# Patient Record
Sex: FEMALE | Race: WHITE | NOT HISPANIC OR LATINO | Employment: OTHER | URBAN - METROPOLITAN AREA
[De-identification: names, ages, dates, MRNs, and addresses within clinical notes are randomized per-mention and may not be internally consistent; named-entity substitution may affect disease eponyms.]

---

## 2017-01-09 ENCOUNTER — TELEPHONE (OUTPATIENT)
Dept: ENDOCRINOLOGY | Facility: CLINIC | Age: 62
End: 2017-01-09

## 2017-01-10 NOTE — TELEPHONE ENCOUNTER
I called Ms.Mary Kay Schwartz to discuss her FNA results   Her FNA shows FLUS   Options are to wait for 6 weeks and repeat FNA with/without genetic testing

## 2017-01-17 ENCOUNTER — PATIENT MESSAGE (OUTPATIENT)
Dept: ENDOCRINOLOGY | Facility: CLINIC | Age: 62
End: 2017-01-17

## 2017-01-23 ENCOUNTER — PATIENT MESSAGE (OUTPATIENT)
Dept: NEUROSURGERY | Facility: CLINIC | Age: 62
End: 2017-01-23

## 2017-01-23 NOTE — TELEPHONE ENCOUNTER
I called Ms.Mary Kay Schwartz discussed about her FLUS   Discussed different options observation vs repeat FNA with or without genetic testing and getting surgery   She would like to think about options and will let us know

## 2017-02-02 ENCOUNTER — PATIENT MESSAGE (OUTPATIENT)
Dept: NEUROSURGERY | Facility: CLINIC | Age: 62
End: 2017-02-02

## 2017-02-06 ENCOUNTER — PATIENT MESSAGE (OUTPATIENT)
Dept: ENDOCRINOLOGY | Facility: CLINIC | Age: 62
End: 2017-02-06

## 2017-03-09 DIAGNOSIS — D35.2 PROLACTINOMA: ICD-10-CM

## 2017-03-09 DIAGNOSIS — E22.0 ACROMEGALY: ICD-10-CM

## 2017-03-09 RX ORDER — CABERGOLINE 0.5 MG/1
TABLET ORAL
Qty: 16 TABLET | Refills: 6 | Status: SHIPPED | OUTPATIENT
Start: 2017-03-09 | End: 2018-03-27 | Stop reason: SDUPTHER

## 2017-04-10 RX ORDER — LEVOTHYROXINE SODIUM 75 UG/1
TABLET ORAL
Qty: 90 TABLET | Refills: 4 | Status: SHIPPED | OUTPATIENT
Start: 2017-04-10 | End: 2017-11-28 | Stop reason: SDUPTHER

## 2017-06-05 ENCOUNTER — PATIENT MESSAGE (OUTPATIENT)
Dept: ENDOCRINOLOGY | Facility: CLINIC | Age: 62
End: 2017-06-05

## 2017-06-05 DIAGNOSIS — E22.0 ACROMEGALY: Primary | ICD-10-CM

## 2017-06-07 NOTE — TELEPHONE ENCOUNTER
Schedule blood work 1 week prior to visit - external orders - please fax her   Book visit with me in pituitary clinic  During visit we will also discuss out goiter    Sent [employer] message to patient explaining the plan.

## 2017-06-26 RX ORDER — HYDROCORTISONE 10 MG/1
TABLET ORAL
Qty: 270 TABLET | Refills: 11 | Status: SHIPPED | OUTPATIENT
Start: 2017-06-26 | End: 2018-07-02 | Stop reason: SDUPTHER

## 2017-07-10 ENCOUNTER — LAB VISIT (OUTPATIENT)
Dept: LAB | Facility: HOSPITAL | Age: 62
End: 2017-07-10
Attending: INTERNAL MEDICINE
Payer: COMMERCIAL

## 2017-07-10 DIAGNOSIS — E03.9 HYPOTHYROIDISM, UNSPECIFIED TYPE: ICD-10-CM

## 2017-07-10 DIAGNOSIS — E22.0 ACROMEGALY: ICD-10-CM

## 2017-07-10 DIAGNOSIS — E03.9 HYPOTHYROIDISM, UNSPECIFIED TYPE: Primary | ICD-10-CM

## 2017-07-10 DIAGNOSIS — E23.0 PANHYPOPITUITARISM: ICD-10-CM

## 2017-07-10 LAB
ALBUMIN SERPL BCP-MCNC: 3.9 G/DL
ALP SERPL-CCNC: 59 U/L
ALT SERPL W/O P-5'-P-CCNC: 18 U/L
ANION GAP SERPL CALC-SCNC: 9 MMOL/L
AST SERPL-CCNC: 18 U/L
BILIRUB SERPL-MCNC: 0.4 MG/DL
BUN SERPL-MCNC: 11 MG/DL
CALCIUM SERPL-MCNC: 9.3 MG/DL
CHLORIDE SERPL-SCNC: 109 MMOL/L
CO2 SERPL-SCNC: 23 MMOL/L
CREAT SERPL-MCNC: 0.9 MG/DL
EST. GFR  (AFRICAN AMERICAN): >60 ML/MIN/1.73 M^2
EST. GFR  (NON AFRICAN AMERICAN): >60 ML/MIN/1.73 M^2
ESTIMATED AVG GLUCOSE: 108 MG/DL
GLUCOSE SERPL-MCNC: 80 MG/DL
HBA1C MFR BLD HPLC: 5.4 %
POTASSIUM SERPL-SCNC: 4.2 MMOL/L
PROT SERPL-MCNC: 7.1 G/DL
SODIUM SERPL-SCNC: 141 MMOL/L
T4 FREE SERPL-MCNC: 1.07 NG/DL
TSH SERPL DL<=0.005 MIU/L-ACNC: 0.03 UIU/ML

## 2017-07-10 PROCEDURE — 83036 HEMOGLOBIN GLYCOSYLATED A1C: CPT

## 2017-07-10 PROCEDURE — 80053 COMPREHEN METABOLIC PANEL: CPT

## 2017-07-10 PROCEDURE — 83003 ASSAY GROWTH HORMONE (HGH): CPT

## 2017-07-10 PROCEDURE — 84439 ASSAY OF FREE THYROXINE: CPT

## 2017-07-10 PROCEDURE — 36415 COLL VENOUS BLD VENIPUNCTURE: CPT | Mod: PO

## 2017-07-10 PROCEDURE — 84305 ASSAY OF SOMATOMEDIN: CPT

## 2017-07-10 PROCEDURE — 84443 ASSAY THYROID STIM HORMONE: CPT

## 2017-07-11 LAB — GH SERPL-MCNC: 0.2 NG/ML

## 2017-07-13 LAB
IGF-I SERPL-MCNC: 158 NG/ML (ref 35–201)
IGF-I Z-SCORE SERPL: 1.28 SD

## 2017-07-18 ENCOUNTER — TELEPHONE (OUTPATIENT)
Dept: NEUROSURGERY | Facility: CLINIC | Age: 62
End: 2017-07-18

## 2017-07-18 ENCOUNTER — OFFICE VISIT (OUTPATIENT)
Dept: ENDOCRINOLOGY | Facility: CLINIC | Age: 62
End: 2017-07-18
Payer: COMMERCIAL

## 2017-07-18 VITALS
WEIGHT: 170.88 LBS | RESPIRATION RATE: 11 BRPM | HEART RATE: 68 BPM | TEMPERATURE: 99 F | BODY MASS INDEX: 28.47 KG/M2 | SYSTOLIC BLOOD PRESSURE: 124 MMHG | DIASTOLIC BLOOD PRESSURE: 79 MMHG | HEIGHT: 65 IN

## 2017-07-18 DIAGNOSIS — E03.8 SECONDARY HYPOTHYROIDISM: ICD-10-CM

## 2017-07-18 DIAGNOSIS — E22.0 ACROMEGALY: Primary | ICD-10-CM

## 2017-07-18 DIAGNOSIS — D35.2 PROLACTINOMA: ICD-10-CM

## 2017-07-18 DIAGNOSIS — D49.7 PITUITARY TUMOR: ICD-10-CM

## 2017-07-18 DIAGNOSIS — E55.9 VITAMIN D DEFICIENCY DISEASE: ICD-10-CM

## 2017-07-18 DIAGNOSIS — M85.80 OSTEOPENIA, UNSPECIFIED LOCATION: ICD-10-CM

## 2017-07-18 DIAGNOSIS — E04.9 GOITER: ICD-10-CM

## 2017-07-18 DIAGNOSIS — E22.2 SIADH (SYNDROME OF INAPPROPRIATE ADH PRODUCTION): ICD-10-CM

## 2017-07-18 PROCEDURE — 99215 OFFICE O/P EST HI 40 MIN: CPT | Mod: S$GLB,,, | Performed by: INTERNAL MEDICINE

## 2017-07-18 PROCEDURE — 99999 PR PBB SHADOW E&M-EST. PATIENT-LVL III: CPT | Mod: PBBFAC,,, | Performed by: INTERNAL MEDICINE

## 2017-07-18 RX ORDER — PSEUDOEPHEDRINE HCL 120 MG/1
120 TABLET, FILM COATED, EXTENDED RELEASE ORAL
COMMUNITY
End: 2021-06-11

## 2017-07-18 NOTE — PATIENT INSTRUCTIONS
Zoledronic Acid injection (Paget's Disease, Osteoporosis)  What is this medicine?  ZOLEDRONIC ACID (BENSON le dron ik AS id) lowers the amount of calcium loss from bone. It is used to treat Paget's disease and osteoporosis in women.  How should I use this medicine?  This medicine is for infusion into a vein. It is given by a health care professional in a hospital or clinic setting.  Talk to your pediatrician regarding the use of this medicine in children. This medicine is not approved for use in children.  What side effects may I notice from receiving this medicine?  Side effects that you should report to your doctor or health care professional as soon as possible:  · allergic reactions like skin rash, itching or hives, swelling of the face, lips, or tongue  · anxiety, confusion, or depression  · breathing problems  · changes in vision  · eye pain  · feeling faint or lightheaded, falls  · jaw pain, especially after dental work  · mouth sores  · muscle cramps, stiffness, or weakness  · redness, blistering, peeling or loosening of the skin, including inside the mouth  · trouble passing urine or change in the amount of urine  Side effects that usually do not require medical attention (report to your doctor or health care professional if they continue or are bothersome):  · bone, joint, or muscle pain  · constipation  · diarrhea  · fever  · hair loss  · irritation at site where injected  · loss of appetite  · nausea, vomiting  · stomach upset  · trouble sleeping  · trouble swallowing  · weak or tired  What may interact with this medicine?  · certain antibiotics given by injection  · NSAIDs, medicines for pain and inflammation, like ibuprofen or naproxen  · some diuretics like bumetanide, furosemide  · teriparatide  What if I miss a dose?  It is important not to miss your dose. Call your doctor or health care professional if you are unable to keep an appointment.  Where should I keep my medicine?  This drug is given in a  hospital or clinic and will not be stored at home.  What should I tell my health care provider before I take this medicine?  They need to know if you have any of these conditions:  · aspirin-sensitive asthma  · cancer, especially if you are receiving medicines used to treat cancer  · dental disease or wear dentures  · infection  · kidney disease  · low levels of calcium in the blood  · past surgery on the parathyroid gland or intestines  · receiving corticosteroids like dexamethasone or prednisone  · an unusual or allergic reaction to zoledronic acid, other medicines, foods, dyes, or preservatives  · pregnant or trying to get pregnant  · breast-feeding  What should I watch for while using this medicine?  Visit your doctor or health care professional for regular checkups. It may be some time before you see the benefit from this medicine. Do not stop taking your medicine unless your doctor tells you to. Your doctor may order blood tests or other tests to see how you are doing.  Women should inform their doctor if they wish to become pregnant or think they might be pregnant. There is a potential for serious side effects to an unborn child. Talk to your health care professional or pharmacist for more information.  You should make sure that you get enough calcium and vitamin D while you are taking this medicine. Discuss the foods you eat and the vitamins you take with your health care professional.  Some people who take this medicine have severe bone, joint, and/or muscle pain. This medicine may also increase your risk for jaw problems or a broken thigh bone. Tell your doctor right away if you have severe pain in your jaw, bones, joints, or muscles. Tell your doctor if you have any pain that does not go away or that gets worse.  Tell your dentist and dental surgeon that you are taking this medicine. You should not have major dental surgery while on this medicine. See your dentist to have a dental exam and fix any dental  problems before starting this medicine. Take good care of your teeth while on this medicine. Make sure you see your dentist for regular follow-up appointments.  Date Last Reviewed:   NOTE:This sheet is a summary. It may not cover all possible information. If you have questions about this medicine, talk to your doctor, pharmacist, or health care provider. Copyright© 2016 [employer]

## 2017-07-18 NOTE — PROGRESS NOTES
Subjective:      Patient ID: Mary Kay Schwartz is a 62 y.o. female.    Chief Complaint:  Acromegaly     History of Present Illness:      Ms.Anne DUANE Schwartz is here for follow up.   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    Pathology report:  [place] DIAGNOSIS:  PITUITARY MASS, BIOPSY ([id number]; 12/8/2010): PITUITARY ADENOMA, PLURIHORMONAL (GROWTH    HORMONE, PROLACTIN AND ALPHA-SUBUNIT           Interventions have included: Two transphenoidal resections in 12/10 and 8/11.    GK radiosurgery 3/2012.         Current medication regimen:   Lanreotide 60 mg once a month -got first dose 3/2011   last dose end of April 2015     Dostinex 0.25 mg 3 x week.   Synthroid  75 mcg qd  - started 8/6/14   HC 10-0-5-0         C scope regularly with gi     Us thyroid with nodule   FNA - FLUS     Hip replacement and L spine fusion   Recent sinus surgery     No polyuria    Patient is feeling good,   no headaches, no major visual disturbances. She does complain of   GI disturbances: Abdominal discomfort, mostly right upper quadrant, bloathing. No diarrhea, no blood in the stools. Now s/p cholecystectomy, followed by GI.     Last mri 10/21/14  Stable postoperative change compatible with prior partial resection of a pituitary prolactinoma with residual tumor identified within the posterior sella extending into the suprasellar cistern  "and possible involvement of the clinoid unchanged from the   prior examination. No new areas of enhancement are identified to suggest progression of disease.    The optic chiasm is not well visualized and likely compressed by residual tumor, also unchanged.    Probable continued invasion of the left cavernous sinus although not well delineated on this examination.        H/o osteopenia -  7/2013 - frax did not support therapy   No fractures         Review of Systems   Constitutional: Positive for fatigue.   Eyes: Negative for visual disturbance.   Respiratory: Negative for shortness of breath.    Cardiovascular: Negative for chest pain.   Gastrointestinal: Negative for abdominal pain, diarrhea and nausea. Constipation:     Endocrine: Negative for polydipsia and polyuria.   Genitourinary: Negative for frequency.   Musculoskeletal: Positive for back pain and myalgias.        +left sciatica    Neurological: Positive for headaches (occassional ).   Psychiatric/Behavioral: Negative for confusion and sleep disturbance.       Objective:   Physical Exam   Neck: No thyromegaly present.   Cardiovascular: Normal rate.    Pulmonary/Chest: Effort normal.   Abdominal: Soft.   Musculoskeletal: She exhibits no edema.     Vitals:    07/18/17 1056   BP: 124/79   BP Location: Left arm   Patient Position: Sitting   BP Method: Automatic   Pulse: 68   Resp: 11   Temp: 98.7 °F (37.1 °C)   Weight: 77.5 kg (170 lb 13.7 oz)   Height: 5' 5" (1.651 m)         Lab Review:   Results for orders placed or performed in visit on 07/10/17   Insulin-like growth factor   Result Value Ref Range    Somatomedin (IGF-I) 158 35 - 201 ng/mL    Z Score 1.28 -2.0 - 2.0 SD   T4, FREE   Result Value Ref Range    Free T4 1.07 0.71 - 1.51 ng/dL   Comprehensive metabolic panel   Result Value Ref Range    Sodium 141 136 - 145 mmol/L    Potassium 4.2 3.5 - 5.1 mmol/L    Chloride 109 95 - 110 mmol/L    CO2 23 23 - 29 mmol/L    Glucose 80 70 - 110 mg/dL    BUN, Bld 11 " 8 - 23 mg/dL    Creatinine 0.9 0.5 - 1.4 mg/dL    Calcium 9.3 8.7 - 10.5 mg/dL    Total Protein 7.1 6.0 - 8.4 g/dL    Albumin 3.9 3.5 - 5.2 g/dL    Total Bilirubin 0.4 0.1 - 1.0 mg/dL    Alkaline Phosphatase 59 55 - 135 U/L    AST 18 10 - 40 U/L    ALT 18 10 - 44 U/L    Anion Gap 9 8 - 16 mmol/L    eGFR if African American >60.0 >60 mL/min/1.73 m^2    eGFR if non African American >60.0 >60 mL/min/1.73 m^2   Hemoglobin A1c   Result Value Ref Range    Hemoglobin A1C 5.4 4.0 - 5.6 %    Estimated Avg Glucose 108 68 - 131 mg/dL   Growth hormone   Result Value Ref Range    Growth Hormone 0.2 0.00 - 8.00 ng/mL   TSH   Result Value Ref Range    TSH 0.025 (L) 0.400 - 4.000 uIU/mL         Lab Results   Component Value Date    HGBA1C 5.4 07/10/2017       Assessment:     1. Acromegaly with residual pit tumor .   - suppressed GH and normal IGF-1   She is doing well       repeat imaging still shows residual tumor     standard of care  Colonoscopy x 3 - negative per her  FNA right thyroid nodule       2. Prolactinoma.   - continue Dostinex  0.25 three times a week  Follow Labs      3.  Osteoporosis   - being treated with orthopedics with prolia    discussed options for reclast vs fosamax vs continue prolia   discussed risk vs benefits for all           4. IGT     alerted as to the increased risk for t2DM  Stressed diet and exercise  Periodic hba1c levels     5 secondary hypothyroidism -  Follow ft4 levels   continue LT4 of 75 mcg daily       6 secondary  Hypogonadism - no need for therapy     7 vit d deficiency - on counter vitamin D 2000 iu a day          8. Sec AI   - hold evening dose of hydrocortisone and next day morning get 8 AM cortisol and ACTH , take morning dose after blood work       Plan:      Follow up:  8AM cortisol and ACTH, proalctin tomorrow with [facility]   FNA right thyroid nodule at [facility] in October 2018 with blood work prior

## 2017-07-20 LAB
ACTH PLAS-MCNC: 14 PG/ML (ref 7.2–63.3)
CORTIS AM PEAK SERPL-MCNC: 2.9 UG/DL (ref 6.2–19.4)
PROLACTIN SERPL-MCNC: 18.4 NG/ML (ref 4.8–23.3)

## 2017-07-24 ENCOUNTER — PATIENT MESSAGE (OUTPATIENT)
Dept: ENDOCRINOLOGY | Facility: CLINIC | Age: 62
End: 2017-07-24

## 2017-08-17 ENCOUNTER — PATIENT MESSAGE (OUTPATIENT)
Dept: NEUROSURGERY | Facility: CLINIC | Age: 62
End: 2017-08-17

## 2017-09-20 ENCOUNTER — OFFICE VISIT (OUTPATIENT)
Dept: ENDOCRINOLOGY | Facility: CLINIC | Age: 62
End: 2017-09-20
Payer: COMMERCIAL

## 2017-09-20 DIAGNOSIS — E04.9 GOITER: Primary | ICD-10-CM

## 2017-09-20 PROCEDURE — 99499 UNLISTED E&M SERVICE: CPT | Mod: S$GLB,,, | Performed by: INTERNAL MEDICINE

## 2017-09-20 PROCEDURE — 88173 CYTOPATH EVAL FNA REPORT: CPT | Mod: 26,,, | Performed by: PATHOLOGY

## 2017-09-20 PROCEDURE — 88173 CYTOPATH EVAL FNA REPORT: CPT | Performed by: PATHOLOGY

## 2017-09-20 PROCEDURE — 99999 PR PBB SHADOW E&M-EST. PATIENT-LVL II: CPT | Mod: PBBFAC,,, | Performed by: INTERNAL MEDICINE

## 2017-09-20 PROCEDURE — 10022 PR FINE NEEDLE ASP;W/IMAGING GUIDANCE: CPT | Mod: S$GLB,,, | Performed by: INTERNAL MEDICINE

## 2017-09-20 PROCEDURE — 76942 ECHO GUIDE FOR BIOPSY: CPT | Mod: S$GLB,,, | Performed by: INTERNAL MEDICINE

## 2017-09-20 NOTE — PROGRESS NOTES
Thyroid ultrasound for FNA    Indication:  nontoxic multi-nodular goiter   - right nodule     Procedure: Ultrasound guidance for fine needle aspiration biopsy  Procedure time out noted.    Real time ultrasound was performed in 2 planes using a [employer]  ultrasound machine.   The nodule was identified as described in report.  Following informed consent and sterile preparation, local anesthesia was achieved using anesthetic spray. FNA guidance was performed by me using direct u/s guidance to confirm accurate needle placement.  Five aspirations were made using 27 gauge needles.  There was minimal blood loss.  Samples were submitted for cytology.  The patient tolerated the procedure well without complication.  After care instructions were provided.      Post operative diagnosis:  thyroid nodule   or nontoxic multi-nodular goiter       Impression:  Uncomplicated FNA biopsy of thyroid nodule under U/S guidance.    Pain scale prior to FNA :0  Pain scale after FNA :0

## 2017-09-22 ENCOUNTER — TELEPHONE (OUTPATIENT)
Dept: ENDOCRINOLOGY | Facility: CLINIC | Age: 62
End: 2017-09-22

## 2017-09-22 NOTE — TELEPHONE ENCOUNTER
Please let Ms.Anne DUANE Schwartz know her FNA is benign      ORDERING PHYSICIAN(S)  BARB HASSAN  CLINICAL DIAGNOSIS/INFORMATION  Clinical information: [facility]  Gross Description  9 slides  1 thinprep:jmb  SPECIMEN  1) FNA R Thyroid (Clinician)  FINAL PATHOLOGIC DIAGNOSIS  Right thyroid, fine-needle aspiration:  [facility] Thyroid Cytology Category: Benign.  Benign follicular epithelial cell groups and colloid present.  Diagnosed by: Laquita Granger M.D.  (Electronically Signed: 2017-09-22 10:22:17)

## 2017-10-03 ENCOUNTER — OFFICE VISIT (OUTPATIENT)
Dept: ENDOCRINOLOGY | Facility: CLINIC | Age: 62
End: 2017-10-03
Payer: COMMERCIAL

## 2017-10-03 ENCOUNTER — OFFICE VISIT (OUTPATIENT)
Dept: NEUROSURGERY | Facility: CLINIC | Age: 62
End: 2017-10-03
Payer: COMMERCIAL

## 2017-10-03 ENCOUNTER — HOSPITAL ENCOUNTER (OUTPATIENT)
Dept: RADIOLOGY | Facility: HOSPITAL | Age: 62
Discharge: HOME | End: 2017-10-03
Attending: NEUROLOGICAL SURGERY
Payer: COMMERCIAL

## 2017-10-03 VITALS
WEIGHT: 168 LBS | SYSTOLIC BLOOD PRESSURE: 122 MMHG | BODY MASS INDEX: 27 KG/M2 | HEIGHT: 66 IN | DIASTOLIC BLOOD PRESSURE: 78 MMHG | TEMPERATURE: 80 F | RESPIRATION RATE: 12 BRPM

## 2017-10-03 DIAGNOSIS — D35.2 PROLACTINOMA: ICD-10-CM

## 2017-10-03 DIAGNOSIS — D49.7 PITUITARY TUMOR: ICD-10-CM

## 2017-10-03 DIAGNOSIS — M85.80 OSTEOPENIA, UNSPECIFIED LOCATION: ICD-10-CM

## 2017-10-03 DIAGNOSIS — E04.9 GOITER: ICD-10-CM

## 2017-10-03 DIAGNOSIS — E22.0 ACROMEGALY: ICD-10-CM

## 2017-10-03 DIAGNOSIS — E22.0 ACROMEGALY: Primary | ICD-10-CM

## 2017-10-03 DIAGNOSIS — E55.9 VITAMIN D DEFICIENCY DISEASE: ICD-10-CM

## 2017-10-03 DIAGNOSIS — E03.8 SECONDARY HYPOTHYROIDISM: ICD-10-CM

## 2017-10-03 DIAGNOSIS — D35.2 PITUITARY ADENOMA: Primary | ICD-10-CM

## 2017-10-03 DIAGNOSIS — D35.2 PITUITARY ADENOMA: ICD-10-CM

## 2017-10-03 PROCEDURE — 25500020 PHARM REV CODE 255: Performed by: NEUROLOGICAL SURGERY

## 2017-10-03 PROCEDURE — 99999 PR PBB SHADOW E&M-EST. PATIENT-LVL III: CPT | Mod: PBBFAC,,, | Performed by: INTERNAL MEDICINE

## 2017-10-03 PROCEDURE — 99499 UNLISTED E&M SERVICE: CPT | Mod: S$GLB,,, | Performed by: INTERNAL MEDICINE

## 2017-10-03 PROCEDURE — 70553 MRI BRAIN STEM W/O & W/DYE: CPT | Mod: 26,,, | Performed by: RADIOLOGY

## 2017-10-03 PROCEDURE — A9585 GADOBUTROL INJECTION: HCPCS | Performed by: NEUROLOGICAL SURGERY

## 2017-10-03 PROCEDURE — 99999 PR PBB SHADOW E&M-EST. PATIENT-LVL II: CPT | Mod: PBBFAC,,, | Performed by: NEUROLOGICAL SURGERY

## 2017-10-03 PROCEDURE — 99213 OFFICE O/P EST LOW 20 MIN: CPT | Mod: S$GLB,,, | Performed by: NEUROLOGICAL SURGERY

## 2017-10-03 PROCEDURE — 70553 MRI BRAIN STEM W/O & W/DYE: CPT | Mod: TC

## 2017-10-03 RX ORDER — GADOBUTROL 604.72 MG/ML
4 INJECTION INTRAVENOUS
Status: COMPLETED | OUTPATIENT
Start: 2017-10-03 | End: 2017-10-03

## 2017-10-03 RX ADMIN — GADOBUTROL 4 ML: 604.72 INJECTION INTRAVENOUS at 12:10

## 2017-10-03 NOTE — PROGRESS NOTES
Subjective:      Patient ID: Mary Kay Schwartz is a 62 y.o. female.    Chief Complaint:  Acromegaly     History of Present Illness:      Ms.Anne DUANE Schwartz is here for follow up.   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    Pathology report:  [place] DIAGNOSIS:  PITUITARY MASS, BIOPSY ([id number]; 12/8/2010): PITUITARY ADENOMA, PLURIHORMONAL (GROWTH    HORMONE, PROLACTIN AND ALPHA-SUBUNIT           Interventions have included: Two transphenoidal resections in 12/10 and 8/11.    GK radiosurgery 3/2012.         Current medication regimen:   Lanreotide 60 mg once a month -got first dose 3/2011   last dose end of April 2015     Dostinex 0.25 mg 3 x week.   Synthroid  75 mcg qd  - started 8/6/14   HC 10-0-5-0         C scope regularly with gi     Us thyroid with nodule   FNA - FLUS     Hip replacement and L spine fusion   Recent sinus surgery     No polyuria    Patient is feeling good,   no headaches, no major visual disturbances. She does complain of   GI disturbances: Abdominal discomfort, mostly right upper quadrant, bloathing. No diarrhea, no blood in the stools. Now s/p cholecystectomy, followed by GI.     Last mri 10/21/14  Stable postoperative change compatible with prior partial resection of a pituitary prolactinoma with residual tumor identified within the posterior sella extending into the suprasellar cistern  and possible involvement of the clinoid unchanged from the   prior examination. No new areas of enhancement are identified to suggest progression of disease.    The optic chiasm is not well visualized and likely compressed by residual tumor, also unchanged.    Probable continued invasion of the left cavernous sinus although not well delineated on this examination.        H/o osteopenia -  7/2013 - frax did not support therapy   No fractures         Review of Systems   Constitutional: Positive for fatigue.   Eyes: Negative for visual disturbance.   Respiratory: Negative for shortness of breath.    Cardiovascular: Negative for chest pain.   Gastrointestinal: Negative for abdominal pain, diarrhea and nausea. Constipation:     Endocrine: Negative for polydipsia and polyuria.   Genitourinary: Negative for frequency.   Musculoskeletal: Positive for back pain and myalgias.        +left sciatica    Neurological: Positive for headaches (occassional ).   Psychiatric/Behavioral: Negative for confusion and sleep disturbance.       Objective:   Physical Exam   Neck: No thyromegaly present.   Cardiovascular: Normal rate.    Pulmonary/Chest: Effort normal.   Abdominal: Soft.   Musculoskeletal: She exhibits no edema.     There were no vitals filed for this visit.      Lab Review:   Results for orders placed or performed in visit on 07/19/17   ACTH   Result Value Ref Range    ACTH 14.0 7.2 - 63.3 pg/mL   Prolactin   Result Value Ref Range    Prolactin 18.4 4.8 - 23.3 ng/mL   Cortisol, 8AM   Result Value Ref Range    AM Cortisol 2.9 (L) 6.2 - 19.4 ug/dL         Lab Results   Component Value Date    HGBA1C 5.4 07/10/2017       Assessment:     1. Acromegaly with residual pit tumor .   - suppressed GH and normal IGF-1   She is doing well       repeat imaging still shows residual tumor     standard of care  Colonoscopy x 3 - negative per her  FNA right thyroid nodule       2. Prolactinoma.   - continue Dostinex  0.25 three times a  week  Follow Labs      3.  Osteoporosis   - being treated with orthopedics with prolia    discussed options for reclast vs fosamax vs continue prolia   discussed risk vs benefits for all           4. IGT     alerted as to the increased risk for t2DM  Stressed diet and exercise  Periodic hba1c levels     5 secondary hypothyroidism -  Follow ft4 levels   continue LT4 of 75 mcg daily       6 secondary  Hypogonadism - no need for therapy     7 vit d deficiency - on counter vitamin D 2000 iu a day          8. Sec AI   - hold evening dose of hydrocortisone and next day morning get 8 AM cortisol and ACTH , take morning dose after blood work       Plan:      Follow up:  8AM cortisol and ACTH, proalctin tomorrow with [facility]   FNA right thyroid nodule at [facility]   RTC in October 2018 with blood work prior

## 2017-10-03 NOTE — PATIENT INSTRUCTIONS
I have reviewed the patient's MRI of brain, which show no evidence of any change or growth. I will schedule the patient a 1 year FU with MRI of brain.

## 2017-10-03 NOTE — PROGRESS NOTES
Subjective:    I, Martha Vides, am scribing for, and in the presence of, Dr. Quentin Carlos.     Patient ID: Mary Kay Schwartz is a 62 y.o. female.    Chief Complaint: No chief complaint on file.    HPI   Pt is a 61 yo female with a pituitary adenoma who presents today for 1 year FU with MRI of brain. At last office visit on 10/18/2016 pt reported she has underwent a lumbar fusion surgery and hip replacement surgery. Today, pt states she is doing well with no new complications.     Review of Systems   Constitutional: Negative for chills and fever.   HENT: Negative.    Eyes: Negative.    Respiratory: Negative.    Cardiovascular: Negative.    Gastrointestinal: Negative.    Endocrine: Negative.    Genitourinary: Negative.    Musculoskeletal: Negative.    Skin: Negative.    Allergic/Immunologic: Negative.    Neurological: Negative for tremors, weakness, light-headedness, numbness and headaches.   Hematological: Negative.    Psychiatric/Behavioral: Negative.        Past Medical History:   Diagnosis Date    Acromegaly     Allergy     VITAMIN D DEFICIENCY    DVT (deep venous thrombosis)     Factor V Leiden mutation     Goiter 10/18/2016    IGT (impaired glucose tolerance)     Menopause present     Migraine headache     Osteoporosis, unspecified     Pituitary macroadenoma     s/p transphenoidal surgery on 12/8/10 by Dr. Hernandez; s/p transphenoidal surgery by Dr. Carlos on  8/23/2011; s/p radiosurgery with 14 Gy to the 50% isodose  line on 3/15/2012    Prolactinoma     Protein C deficiency     Prothrombin gene mutation     Vitamin D deficiency disease        Objective:     There were no vitals taken for this visit.    Physical Exam   Constitutional: She is oriented to person, place, and time. She appears well-developed and well-nourished.   Eyes: Pupils are equal, round, and reactive to light.   Neurological: She is alert and oriented to person, place, and time. No cranial nerve deficit.       Imaging:  MRI Brain W WO  Contrast 10/3/2017 shows no evidence of any change or growth.    I have personally reviewed the images with the pt.      I, Dr. Quentin Carlos, personally performed the services described in this documentation as scribed by Martha Vides in my presence, and it is both accurate and complete.    Assessment:       1. Pituitary adenoma    2. Acromegaly    3. Prolactinoma        Plan:   I have reviewed the patient's MRI of brain, which show no evidence of any change or growth. I will schedule the patient a 1 year FU with MRI of brain.

## 2017-11-28 NOTE — TELEPHONE ENCOUNTER
----- Message from Brandie Melinda sent at 11/28/2017 11:13 AM CST -----  Contact: Trish   [phone]  Caller says she will be using a new Pharmacy :  [facility]  [phone]   pls send the Generic Synthroid .075mgs. /30 day supply.    Pls add this pharmacy to her file. As per pt.

## 2017-11-29 RX ORDER — LEVOTHYROXINE SODIUM 75 UG/1
75 TABLET ORAL
Qty: 90 TABLET | Refills: 4 | Status: SHIPPED | OUTPATIENT
Start: 2017-11-29 | End: 2019-02-28 | Stop reason: SDUPTHER

## 2018-01-22 ENCOUNTER — PATIENT MESSAGE (OUTPATIENT)
Dept: ENDOCRINOLOGY | Facility: CLINIC | Age: 63
End: 2018-01-22

## 2018-03-20 ENCOUNTER — PATIENT MESSAGE (OUTPATIENT)
Dept: ENDOCRINOLOGY | Facility: CLINIC | Age: 63
End: 2018-03-20

## 2018-03-27 DIAGNOSIS — D35.2 PROLACTINOMA: ICD-10-CM

## 2018-03-27 DIAGNOSIS — E22.0 ACROMEGALY: ICD-10-CM

## 2018-03-28 RX ORDER — CABERGOLINE 0.5 MG/1
TABLET ORAL
Qty: 16 TABLET | Refills: 6 | Status: SHIPPED | OUTPATIENT
Start: 2018-03-28 | End: 2019-05-09 | Stop reason: SDUPTHER

## 2018-07-02 RX ORDER — HYDROCORTISONE 10 MG/1
TABLET ORAL
Qty: 270 TABLET | Refills: 1 | Status: SHIPPED | OUTPATIENT
Start: 2018-07-02 | End: 2019-07-01 | Stop reason: SDUPTHER

## 2018-07-02 NOTE — TELEPHONE ENCOUNTER
----- Message from Caren Guevara sent at 7/2/2018 10:08 AM CDT -----  Contact: Pt    [phone]  Rx Refill/Request     Is this a Refill or New Rx:  Refill    Rx Name and Strength:  hydrocortisone (CORTEF) 10 MG Tab  Preferred Pharmacy with phone number:   [facility]  In [facility]  Communication Preference:  Additional Information:

## 2018-08-20 ENCOUNTER — TELEPHONE (OUTPATIENT)
Dept: NEUROSURGERY | Facility: CLINIC | Age: 63
End: 2018-08-20

## 2018-08-20 DIAGNOSIS — D35.2 PITUITARY ADENOMA: Primary | ICD-10-CM

## 2018-08-21 ENCOUNTER — TELEPHONE (OUTPATIENT)
Dept: NEUROSURGERY | Facility: CLINIC | Age: 63
End: 2018-08-21

## 2018-08-21 NOTE — TELEPHONE ENCOUNTER
Called Ms Schwartz with endo appt. Accepted date and time.    ----- Message from Kacy Bernard RN sent at 8/21/2018  9:51 AM CDT -----  Regarding: RE: Murali Pt  As of right now that is all I have. Sorry!    ----- Message -----  From: Aiyana Peraza RN  Sent: 8/21/2018   9:45 AM  To: Kacy Bernard RN  Subject: RE: Murali Pt                                     Darek. She is 2 hrs away. I can offer it.    Thanks!  ----- Message -----  From: Kacy Bernard RN  Sent: 8/21/2018   9:40 AM  To: Aiyana Peraza RN  Subject: RE: Murali Pt                                     I have an 8am on that day.   ----- Message -----  From: Aiyana Peraza RN  Sent: 8/20/2018   2:23 PM  To: Kacy Bernard RN  Subject: Murali Pt                                         Hi, Lora,    This pt has her 1 yr f/u coming in October. As she is coming from the [facility], is there a possibility you can schedule her with someone on the day we are seeing her? Oct 9. MRI @ 11:30, appt @ 1:45. We will likely be able to see her before 1:45 so any time after 2:30 maybe?    Let me know if this is possible.    Thanks so much!    Farhana

## 2018-09-27 ENCOUNTER — LAB VISIT (OUTPATIENT)
Dept: LAB | Facility: HOSPITAL | Age: 63
End: 2018-09-27
Attending: INTERNAL MEDICINE
Payer: COMMERCIAL

## 2018-09-27 DIAGNOSIS — D35.2 PITUITARY ADENOMA: ICD-10-CM

## 2018-09-27 LAB
ALBUMIN SERPL BCP-MCNC: 4.1 G/DL
ALP SERPL-CCNC: 57 U/L
ALT SERPL W/O P-5'-P-CCNC: 16 U/L
ANION GAP SERPL CALC-SCNC: 5 MMOL/L
AST SERPL-CCNC: 17 U/L
BILIRUB SERPL-MCNC: 0.4 MG/DL
BUN SERPL-MCNC: 18 MG/DL
CALCIUM SERPL-MCNC: 9.3 MG/DL
CHLORIDE SERPL-SCNC: 107 MMOL/L
CO2 SERPL-SCNC: 28 MMOL/L
CORTIS SERPL-MCNC: 2.5 UG/DL
CREAT SERPL-MCNC: 0.8 MG/DL
EST. GFR  (AFRICAN AMERICAN): >60 ML/MIN/1.73 M^2
EST. GFR  (NON AFRICAN AMERICAN): >60 ML/MIN/1.73 M^2
FSH SERPL-ACNC: 0.6 MIU/ML
GLUCOSE SERPL-MCNC: 81 MG/DL
LH SERPL-ACNC: <0.1 MIU/ML
POTASSIUM SERPL-SCNC: 3.8 MMOL/L
PROLACTIN SERPL IA-MCNC: 15.3 NG/ML
PROT SERPL-MCNC: 6.7 G/DL
SODIUM SERPL-SCNC: 140 MMOL/L
T4 FREE SERPL-MCNC: 0.96 NG/DL

## 2018-09-27 PROCEDURE — 82533 TOTAL CORTISOL: CPT

## 2018-09-27 PROCEDURE — 82024 ASSAY OF ACTH: CPT

## 2018-09-27 PROCEDURE — 83002 ASSAY OF GONADOTROPIN (LH): CPT

## 2018-09-27 PROCEDURE — 83001 ASSAY OF GONADOTROPIN (FSH): CPT

## 2018-09-27 PROCEDURE — 84305 ASSAY OF SOMATOMEDIN: CPT

## 2018-09-27 PROCEDURE — 84146 ASSAY OF PROLACTIN: CPT

## 2018-09-27 PROCEDURE — 80053 COMPREHEN METABOLIC PANEL: CPT

## 2018-09-27 PROCEDURE — 84439 ASSAY OF FREE THYROXINE: CPT

## 2018-09-27 PROCEDURE — 36415 COLL VENOUS BLD VENIPUNCTURE: CPT

## 2018-09-28 LAB — ACTH PLAS-MCNC: 9 PG/ML

## 2018-10-01 LAB
IGF-I SERPL-MCNC: 142 NG/ML (ref 35–201)
IGF-I Z-SCORE SERPL: 1.04 SD

## 2018-10-09 ENCOUNTER — HOSPITAL ENCOUNTER (OUTPATIENT)
Dept: RADIOLOGY | Facility: HOSPITAL | Age: 63
Discharge: HOME | End: 2018-10-09
Attending: NEUROLOGICAL SURGERY
Payer: COMMERCIAL

## 2018-10-09 ENCOUNTER — OFFICE VISIT (OUTPATIENT)
Dept: ENDOCRINOLOGY | Facility: CLINIC | Age: 63
End: 2018-10-09
Payer: COMMERCIAL

## 2018-10-09 ENCOUNTER — OFFICE VISIT (OUTPATIENT)
Dept: NEUROSURGERY | Facility: CLINIC | Age: 63
End: 2018-10-09
Payer: COMMERCIAL

## 2018-10-09 VITALS
SYSTOLIC BLOOD PRESSURE: 122 MMHG | DIASTOLIC BLOOD PRESSURE: 73 MMHG | WEIGHT: 168.44 LBS | HEIGHT: 66 IN | TEMPERATURE: 99 F | HEART RATE: 63 BPM | BODY MASS INDEX: 27.07 KG/M2

## 2018-10-09 VITALS
SYSTOLIC BLOOD PRESSURE: 118 MMHG | DIASTOLIC BLOOD PRESSURE: 80 MMHG | WEIGHT: 166.69 LBS | HEART RATE: 60 BPM | RESPIRATION RATE: 16 BRPM | BODY MASS INDEX: 26.79 KG/M2 | HEIGHT: 66 IN

## 2018-10-09 DIAGNOSIS — E22.0 ACROMEGALY: Primary | ICD-10-CM

## 2018-10-09 DIAGNOSIS — E03.9 HYPOTHYROIDISM, UNSPECIFIED TYPE: ICD-10-CM

## 2018-10-09 DIAGNOSIS — M85.80 OSTEOPENIA, UNSPECIFIED LOCATION: ICD-10-CM

## 2018-10-09 DIAGNOSIS — D49.7 PITUITARY TUMOR: ICD-10-CM

## 2018-10-09 DIAGNOSIS — D35.2 PROLACTINOMA: ICD-10-CM

## 2018-10-09 DIAGNOSIS — E04.1 THYROID NODULE: ICD-10-CM

## 2018-10-09 DIAGNOSIS — D35.2 PITUITARY ADENOMA: ICD-10-CM

## 2018-10-09 DIAGNOSIS — E23.0 PANHYPOPITUITARISM: ICD-10-CM

## 2018-10-09 PROCEDURE — 3008F BODY MASS INDEX DOCD: CPT | Mod: CPTII,S$GLB,, | Performed by: NEUROLOGICAL SURGERY

## 2018-10-09 PROCEDURE — 99214 OFFICE O/P EST MOD 30 MIN: CPT | Mod: S$GLB,,, | Performed by: NEUROLOGICAL SURGERY

## 2018-10-09 PROCEDURE — 3008F BODY MASS INDEX DOCD: CPT | Mod: CPTII,S$GLB,, | Performed by: INTERNAL MEDICINE

## 2018-10-09 PROCEDURE — A9585 GADOBUTROL INJECTION: HCPCS | Performed by: NEUROLOGICAL SURGERY

## 2018-10-09 PROCEDURE — 99999 PR PBB SHADOW E&M-EST. PATIENT-LVL III: CPT | Mod: PBBFAC,,, | Performed by: INTERNAL MEDICINE

## 2018-10-09 PROCEDURE — 25500020 PHARM REV CODE 255: Performed by: NEUROLOGICAL SURGERY

## 2018-10-09 PROCEDURE — 70553 MRI BRAIN STEM W/O & W/DYE: CPT | Mod: TC

## 2018-10-09 PROCEDURE — 99999 PR PBB SHADOW E&M-EST. PATIENT-LVL III: CPT | Mod: PBBFAC,,, | Performed by: NEUROLOGICAL SURGERY

## 2018-10-09 PROCEDURE — 99214 OFFICE O/P EST MOD 30 MIN: CPT | Mod: S$GLB,,, | Performed by: INTERNAL MEDICINE

## 2018-10-09 PROCEDURE — 70553 MRI BRAIN STEM W/O & W/DYE: CPT | Mod: 26,,, | Performed by: RADIOLOGY

## 2018-10-09 RX ORDER — CHOLECALCIFEROL (VITAMIN D3) 125 MCG
CAPSULE ORAL
COMMUNITY

## 2018-10-09 RX ORDER — GADOBUTROL 604.72 MG/ML
4 INJECTION INTRAVENOUS
Status: COMPLETED | OUTPATIENT
Start: 2018-10-09 | End: 2018-10-09

## 2018-10-09 RX ORDER — SELENIUM 200 MCG
TABLET ORAL
COMMUNITY

## 2018-10-09 RX ADMIN — GADOBUTROL 4 ML: 604.72 INJECTION INTRAVENOUS at 12:10

## 2018-10-09 NOTE — PROGRESS NOTES
Subjective:      Patient ID: Mary Kay Schwartz is a 63 y.o. female.    Chief Complaint:  Acromegaly     History of Present Illness:      Ms.Anne DUANE Schwartz is here for follow up.   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    Pathology report:  [place] DIAGNOSIS:  PITUITARY MASS, BIOPSY ([id number]; 12/8/2010): PITUITARY ADENOMA, PLURIHORMONAL (GROWTH    HORMONE, PROLACTIN AND ALPHA-SUBUNIT           Interventions have included: Two transphenoidal resections in 12/10 and 8/11.    GK radiosurgery 3/2012.         Current medication regimen:   Lanreotide 60 mg once a month -got first dose 3/2011   last dose end of April 2015     Dostinex 0.25 mg 3 x week.   Synthroid  75 mcg qd  - started 8/6/14   HC 10-0-5-0         C scope regularly with gi     Us thyroid with nodule   FNA - FLUS     Hip replacement and L spine fusion   Recent sinus surgery     No polyuria    Patient is feeling good,   no headaches, no major visual disturbances. She does complain of   GI disturbances: Abdominal discomfort, mostly right upper quadrant, bloathing. No diarrhea, no blood in the stools. Now s/p cholecystectomy, followed by GI.     Last mri 10/21/14  Stable postoperative change compatible with prior partial resection of a pituitary prolactinoma with residual tumor identified within the posterior sella extending into the suprasellar cistern  "and possible involvement of the clinoid unchanged from the   prior examination. No new areas of enhancement are identified to suggest progression of disease.    The optic chiasm is not well visualized and likely compressed by residual tumor, also unchanged.    Probable continued invasion of the left cavernous sinus although not well delineated on this examination.        H/o osteopenia -  7/2013 - frax did not support therapy   No fractures         Review of Systems   Constitutional: Positive for diaphoresis and fatigue.   Eyes: Negative for visual disturbance.   Respiratory: Negative for cough and chest tightness.    Cardiovascular: Negative for chest pain.   Gastrointestinal: Negative for abdominal distention, abdominal pain, diarrhea and nausea. Constipation:     Endocrine: Negative for polydipsia and polyuria.   Genitourinary: Negative for difficulty urinating, dysuria and frequency.        Nocturia x 1.   Musculoskeletal: Positive for back pain and myalgias.        +left sciatica    Neurological: Positive for headaches (occassional ).   Psychiatric/Behavioral: Negative for confusion and sleep disturbance.       Objective:   Physical Exam   Neck: No thyromegaly present.   Cardiovascular: Normal rate.   Pulmonary/Chest: Effort normal.   Abdominal: Soft.   Musculoskeletal: She exhibits no edema.     Vitals:    10/09/18 0934   BP: 118/80   BP Location: Left arm   Patient Position: Sitting   BP Method: Medium (Manual)   Pulse: 60   Resp: 16   Weight: 75.6 kg (166 lb 10.7 oz)   Height: 5' 6" (1.676 m)         Lab Review:   Results for orders placed or performed in visit on 09/27/18   ACTH   Result Value Ref Range    ACTH 9 0 - 46 pg/mL   Cortisol, 8AM   Result Value Ref Range    Cortisol -8 AM 2.50 (L) 4.30 - 22.40 ug/dL   Luteinizing hormone   Result Value Ref Range    LH <0.1 See Text mIU/mL   Follicle stimulating hormone   Result Value Ref Range    FSH 0.60 See Text mIU/mL   Prolactin   Result Value Ref Range    " Prolactin 15.3 5.2 - 26.5 ng/mL   Insulin-like growth factor   Result Value Ref Range    Somatomedin (IGF-I) 142 35 - 201 ng/mL    Z Score 1.04 -2.0 - 2.0 SD   T4, free   Result Value Ref Range    Free T4 0.96 0.71 - 1.51 ng/dL   Comprehensive metabolic panel   Result Value Ref Range    Sodium 140 136 - 145 mmol/L    Potassium 3.8 3.5 - 5.1 mmol/L    Chloride 107 95 - 110 mmol/L    CO2 28 23 - 29 mmol/L    Glucose 81 70 - 110 mg/dL    BUN, Bld 18 8 - 23 mg/dL    Creatinine 0.8 0.5 - 1.4 mg/dL    Calcium 9.3 8.7 - 10.5 mg/dL    Total Protein 6.7 6.0 - 8.4 g/dL    Albumin 4.1 3.5 - 5.2 g/dL    Total Bilirubin 0.4 0.1 - 1.0 mg/dL    Alkaline Phosphatase 57 55 - 135 U/L    AST 17 10 - 40 U/L    ALT 16 10 - 44 U/L    Anion Gap 5 (L) 8 - 16 mmol/L    eGFR if African American >60.0 >60 mL/min/1.73 m^2    eGFR if non African American >60.0 >60 mL/min/1.73 m^2         Lab Results   Component Value Date    HGBA1C 5.4 07/10/2017       Assessment:     1. Acromegaly with residual pit tumor .   - suppressed GH and normal IGF-1   She is doing well    Needs thyroid ultrasound for right lobe nodule.       standard of care  Colonoscopy x 3 - negative per her  FNA right thyroid nodule       2. Prolactinoma.   - continue Dostinex  0.25 three times a week  Follow Labs      3.  Osteoporosis   - being treated with orthopedics with prolia    discussed options for reclast vs fosamax vs continue prolia   discussed risk vs benefits for all           4. IGT     alerted as to the increased risk for t2DM  Stressed diet and exercise  Periodic hba1c levels     5 secondary hypothyroidism -  Follow ft4 levels   continue LT4 of 75 mcg daily       6 secondary  Hypogonadism - no need for therapy     7 vit d deficiency - on counter vitamin D 2000 iu a day          8. Sec AI   - hold evening dose of hydrocortisone and next day morning get 8 AM cortisol and ACTH , take morning dose after blood work       Plan:      Thyroid ultrasound

## 2018-10-09 NOTE — PATIENT INSTRUCTIONS
I have personally reviewed the MRI brain with the pt which shows continued heterogeneous signal within the floor of the sella with operative change from transsphenoidal sellar/suprasellar lesion resection. Residual lesion along the posterior aspect of the sella, extending to the suprasellar cistern, is stable when compared to prior images.    I will schedule the patient for 2 year follow up with MRI brain and pituitary labs.

## 2018-10-11 ENCOUNTER — HOSPITAL ENCOUNTER (OUTPATIENT)
Dept: RADIOLOGY | Facility: CLINIC | Age: 63
Discharge: HOME | End: 2018-10-11
Attending: INTERNAL MEDICINE
Payer: COMMERCIAL

## 2018-10-11 DIAGNOSIS — E22.0 ACROMEGALY: ICD-10-CM

## 2018-10-11 DIAGNOSIS — E04.1 THYROID NODULE: ICD-10-CM

## 2018-10-11 DIAGNOSIS — E23.0 PANHYPOPITUITARISM: ICD-10-CM

## 2018-10-11 PROCEDURE — 76536 US EXAM OF HEAD AND NECK: CPT | Mod: 26,,, | Performed by: RADIOLOGY

## 2018-10-11 PROCEDURE — 76536 US EXAM OF HEAD AND NECK: CPT | Mod: TC,PO

## 2019-02-28 RX ORDER — LEVOTHYROXINE SODIUM 75 UG/1
75 TABLET ORAL
Qty: 90 TABLET | Refills: 4 | Status: SHIPPED | OUTPATIENT
Start: 2019-02-28 | End: 2019-03-01 | Stop reason: SDUPTHER

## 2019-03-01 RX ORDER — LEVOTHYROXINE SODIUM 75 UG/1
75 TABLET ORAL
Qty: 90 TABLET | Refills: 4 | Status: SHIPPED | OUTPATIENT
Start: 2019-03-01 | End: 2019-03-04 | Stop reason: SDUPTHER

## 2019-03-01 NOTE — TELEPHONE ENCOUNTER
----- Message from Paz Bingham sent at 3/1/2019  3:53 PM CST -----  Contact: Self [phone]  .Medication question / problems.  Pls transfer levothyroxine (SYNTHROID) 75 MCG tablet to     ..     [facility] [address] [place] [address], [place] - [address] [place] [address]  [address] [address] [address]  Phone: [phone] Fax: [phone]  PT is out of medication.

## 2019-03-04 RX ORDER — LEVOTHYROXINE SODIUM 75 UG/1
75 TABLET ORAL
Qty: 90 TABLET | Refills: 4 | Status: SHIPPED | OUTPATIENT
Start: 2019-03-04 | End: 2019-03-04 | Stop reason: SDUPTHER

## 2019-03-04 RX ORDER — LEVOTHYROXINE SODIUM 75 UG/1
75 TABLET ORAL
Qty: 90 TABLET | Refills: 4 | Status: SHIPPED | OUTPATIENT
Start: 2019-03-04 | End: 2019-03-05 | Stop reason: SDUPTHER

## 2019-03-04 NOTE — TELEPHONE ENCOUNTER
----- Message from Paz Bingham sent at 3/4/2019  9:58 AM CST -----  Contact: Self [phone]  .Medication question / problems.  Pls transfer levothyroxine (SYNTHROID) 75 MCG tablet to      ..    [facility] [address] [place] [address] [place] [address]  [address] [address] [address]  Phone: [phone] Fax: [phone]    PT is out of medication.  Pls delete [facility] from chart.

## 2019-03-05 ENCOUNTER — PATIENT MESSAGE (OUTPATIENT)
Dept: ENDOCRINOLOGY | Facility: CLINIC | Age: 64
End: 2019-03-05

## 2019-03-06 RX ORDER — LEVOTHYROXINE SODIUM 75 UG/1
75 TABLET ORAL
Qty: 90 TABLET | Refills: 4 | Status: SHIPPED | OUTPATIENT
Start: 2019-03-06 | End: 2020-06-05 | Stop reason: SDUPTHER

## 2019-05-09 DIAGNOSIS — E22.0 ACROMEGALY: ICD-10-CM

## 2019-05-09 DIAGNOSIS — D35.2 PROLACTINOMA: ICD-10-CM

## 2019-05-09 RX ORDER — CABERGOLINE 0.5 MG/1
TABLET ORAL
Qty: 16 TABLET | Refills: 6 | Status: SHIPPED | OUTPATIENT
Start: 2019-05-09 | End: 2019-05-16 | Stop reason: SDUPTHER

## 2019-05-09 NOTE — TELEPHONE ENCOUNTER
----- Message from Paz Bingham sent at 5/9/2019 10:10 AM CDT -----  Contact: [facility] [phone]  ..Refill request.  cabergoline (DOSTINEX) 0.5 mg tablet    ..  [facility] [address] [facility], [place] - [address] [place] [address]  [address] [address] [address]  Phone: [phone] Fax: [phone]

## 2019-05-16 DIAGNOSIS — E22.0 ACROMEGALY: ICD-10-CM

## 2019-05-16 DIAGNOSIS — D35.2 PROLACTINOMA: ICD-10-CM

## 2019-05-16 RX ORDER — CABERGOLINE 0.5 MG/1
TABLET ORAL
Qty: 16 TABLET | Refills: 6 | Status: SHIPPED | OUTPATIENT
Start: 2019-05-16 | End: 2020-06-01 | Stop reason: SDUPTHER

## 2019-07-01 RX ORDER — HYDROCORTISONE 10 MG/1
TABLET ORAL
Qty: 270 TABLET | Refills: 1 | Status: SHIPPED | OUTPATIENT
Start: 2019-07-01 | End: 2020-06-15 | Stop reason: SDUPTHER

## 2020-06-01 DIAGNOSIS — E22.0 ACROMEGALY: ICD-10-CM

## 2020-06-01 DIAGNOSIS — D35.2 PROLACTINOMA: ICD-10-CM

## 2020-06-01 RX ORDER — CABERGOLINE 0.5 MG/1
TABLET ORAL
Qty: 16 TABLET | Refills: 6 | Status: SHIPPED | OUTPATIENT
Start: 2020-06-01 | End: 2021-07-02 | Stop reason: SDUPTHER

## 2020-06-01 RX ORDER — CABERGOLINE 0.5 MG/1
TABLET ORAL
Qty: 8 TABLET | Refills: 0 | OUTPATIENT
Start: 2020-06-01

## 2020-06-04 NOTE — PROGRESS NOTES
Subjective:      Patient ID: Mary Kay Schwartz is a 65 y.o. female.    Chief Complaint:  No chief complaint on file.    History of Present Illness  Mary Kay Schwartz is here for follow up of pituitary adenoma.  Previously seen by Dr. Klein.  Last seen 10/9/18.  This is their first visit with me.    This is a MyChart video visit.    The patient location is: [place], [place]  The chief complaint leading to consultation is: pituitary tumor  Visit type: Virtual visit with synchronous audio and video  Total time spent with patient: see below.   Each patient to whom he or she provides medical services by telemedicine is:  (1) informed of the relationship between the physician and patient and the respective role of any other health care provider with respect to management of the patient; and (2) notified that he or she may decline to receive medical services by telemedicine and may withdraw from such care at any time.    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 At that time her tumor was less than 1 cm in size. Her periods came back and she had symptom free years until May 2010  when she started to see blind spots that became worse thereafter with visual field defects, she then had an MRI that showed the mass to grow to 6.1 cm, she then was evaluated further with hormonal w/u showing high IGF1,UFC and prolactin with increased alpha subunit.  Pathology report:  [place] DIAGNOSIS:  PITUITARY MASS, BIOPSY ([id number]; 12/8/2010): PITUITARY ADENOMA, PLURIHORMONAL (GROWTH HORMONE, PROLACTIN AND ALPHA-SUBUNIT)     Interventions have included:   Two transphenoidal resections in 12/10 and 8/11.   GK radiosurgery 3/2012.     2015 hospitalizations for hyponatremia     MRI 10/9/18  Sella: Remote operative change from transsphenoidal sellar/suprasellar lesion resection.  There is continued heterogeneous the adrenal within the floor of the  sella with continued enhancing focus within the posterior sella extending along the suprasellar cistern this measures approximately 0.8 by 1.4 x 1.7 cm in AP by TV by CC dimensions respectively.  There is continued extension along the posterior aspect of the right cavernous sinus without definite cavernous sinus involvement.  There is continued abutment of the right aspect of the optic chiasm allowing as well as the floor of the right hypothalamus and possible encasement of the right posterior cerebral artery  No significant increased mass effect on the suprasellar neurovascular structures.  IMPRESSION:  No significant change from prior.  Continued heterogeneous signal within the floor of the sella with operative change from transsphenoidal sellar/suprasellar lesion resection.  There is continued though relatively stable residual lesion along the posterior aspect of the sella extending to the suprasellar cistern.    Follows with Dr. Carlos- last visit 10/9/18 -   I have personally reviewed the MRI brain with the pt which shows continued heterogeneous signal within the floor of the sella with operative change from transsphenoidal sellar/suprasellar lesion resection. Residual lesion along the posterior aspect of the sella, extending to the suprasellar cistern, is stable when compared to prior images.   I will schedule the patient for 2 year follow up with MRI brain and pituitary labs.     Ref. Range 9/27/2018 08:36   FSH Latest Ref Range: See Text mIU/mL 0.60   LH Latest Ref Range: See Text mIU/mL <0.1   Prolactin Latest Ref Range: 5.2 - 26.5 ng/mL 15.3   Cortisol -8 AM Latest Ref Range: 4.30 - 22.40 ug/dL 2.50 (L)   ACTH Latest Ref Range: 0 - 46 pg/mL 9   Somatomedin (IGF-I) Latest Ref Range: 35 - 201 ng/mL 142     Current Meds:  Cabergoline 0.5mg one half tablet three times a week  HC (started 2/2016) 10mg in the AM 5mg in the PM   LT4 (follows free T4- started 8/2014) 75mcg one tablet daily  Lanreotide  ([id number])    Denies weight changes, appetite changes, fatigue, constipation/diarrhea, mood changes..    Denies cold/heat intolerance, skin changes, muscle aches, tremor/sweating/palpitations.  + hair loss    Denies change in facial appearance, change in ring/hat/shoe size, snoring.    Denies history of HTN or diabetes    Denies anorexia/nausea/vomiting, orthostatic symptoms.    Denies facial edema/erythema, hirsutism/acne/voice deepening, truncal obesity, striae/bruising, kidney stones/hematuria, proximal muscle weakness.     Denies galactorrhea, polyuria/polydipsia, unusual headaches, peripheral vision loss.     Denies polyuria.    With regards to thyroid nodule:    Thyroid US: 10/11/18  Multinodular thyroid gland as described above.  No previous imaging studies are available for comparison.  Surveillance with a repeat ultrasound in 6-12 months is recommended.    FNA:   12/16/16  THYROID GLAND, RIGHT LOBE, FNA (CYTOLOGY):  [facility] Thyroid Cytology Category: Follicular Lesion of Undetermined Significance (FLUS).  Cellular specimen with crowded groups of follicular epithelial cells with some microfocllicles, a follicular neoplasm  cannot be entirely excluded.  Scant colloid present.  9/20/17  Right thyroid, fine-needle aspiration:  [facility] Thyroid Cytology Category: Benign.  Benign follicular epithelial cell groups and colloid present.  Diagnosed    Signs or Symptoms:   Difficulty breathing: Denies  Difficulty swallowing: Denies  Voice Changes: Denies  FH of thyroid cancer: Denies  Personal history of radiation treatment or exposure: Denies    With regards to IGT:      Ref. Range 7/10/2017 09:43   Hemoglobin A1C External Latest Ref Range: 4.0 - 5.6 % 5.4     Symptoms of hyperglycemia:  - polyuria  - polydipsia  - nocturia  - unexplained weight loss  - blurred vision    With regards to Vitamin D Deficiency:  Vit D, 25-Hydroxy   Date Value Ref Range Status   10/07/2015 32 30 - 96 ng/mL Final      Comment:     Vitamin D deficiency.........<10 ng/mL                              Vitamin D insufficiency......10-29 ng/mL       Vitamin D sufficiency........> or equal to 30 ng/mL  Vitamin D toxicity............>100 ng/mL       Current Meds: OTC vit D3 5000iu daily.     With regards to osteoporosis:     Follows with orthopedics - Prolia - stopped due to SE (thigh and leg pain).     Review of Systems   Constitutional: Negative for fatigue.   Eyes: Negative for visual disturbance.   Respiratory: Negative for shortness of breath.    Cardiovascular: Negative for chest pain.   Gastrointestinal: Negative for abdominal pain.   Musculoskeletal: Negative for arthralgias.   Skin: Negative for wound.   Neurological: Negative for headaches.   Hematological: Does not bruise/bleed easily.   Psychiatric/Behavioral: Negative for sleep disturbance.       Lab Review:   Lab Results   Component Value Date    HGBA1C 5.4 07/10/2017    HGBA1C 5.4 06/28/2016    HGBA1C 5.5 10/07/2015       Lab Results   Component Value Date    CHOL 226 (H) 06/28/2016    HDL 60 06/28/2016    LDLCALC 142.8 06/28/2016    TRIG 116 06/28/2016    CHOLHDL 26.5 06/28/2016     Lab Results   Component Value Date     09/27/2018    K 3.8 09/27/2018     09/27/2018    CO2 28 09/27/2018    GLU 81 09/27/2018    BUN 18 09/27/2018    CREATININE 0.8 09/27/2018    CALCIUM 9.3 09/27/2018    PROT 6.7 09/27/2018    ALBUMIN 4.1 09/27/2018    BILITOT 0.4 09/27/2018    ALKPHOS 57 09/27/2018    AST 17 09/27/2018    ALT 16 09/27/2018    ANIONGAP 5 (L) 09/27/2018    ESTGFRAFRICA >60.0 09/27/2018    EGFRNONAA >60.0 09/27/2018    TSH 0.025 (L) 07/10/2017     Vit D, 25-Hydroxy   Date Value Ref Range Status   10/07/2015 32 30 - 96 ng/mL Final     Comment:     Vitamin D deficiency.........<10 ng/mL                              Vitamin D insufficiency......10-29 ng/mL       Vitamin D sufficiency........> or equal to 30 ng/mL  Vitamin D toxicity............>100 ng/mL        Assessment and Plan     1. Pituitary tumor  ACTH    Cortisol, 8AM    Insulin-like growth factor    Prolactin    TSH    T4, free    Comprehensive metabolic panel   2. Acromegaly  Insulin-like growth factor   3. Secondary hypothyroidism  TSH    T4, free   4. Osteopenia, unspecified location     5. Goiter  US Soft Tissue Head Neck Thyroid   6. IGT (impaired glucose tolerance)  Hemoglobin A1C   7. Vitamin D deficiency disease  Vitamin D     Pituitary tumor  -- Pituitary mass - growth hormone and prolactin secreting.  -- S/p two transphenoidal resection December 2010 and August 2011.  -- GK radiosurgery March 2012.  -- Lanreotide treatment ([id number]).   -- MRI due 10/2020 - follows with Dr. Carlos.   -- Current Meds:  Cabergoline 0.5mg one half tablet three times a week  HC (started 2/2016) 10mg in the AM 5mg in the PM   LT4 (follows free T4- started 8/2014) 75mcg one tablet daily  -- Sick day precautions discussed.  -- Check 8am labs - ACTH, cortisol (hold HC evening prior and take AM dose after labs), IGF1, prolactin, TSH, free T4.  -- Follow up in pituitary clinic.    Acromegaly  -- See above.     Secondary hypothyroidism  -- Follow free T4.  -- Medication Changes:   CONTINUE: current regimen.   -- Goal is a free T4 ~1.  -- Avoid exogenous hyperthyroidism as this can accelerate bone loss and increase risk of CV complications.  -- Advised to take LT4 on an empty stomach with water and to wait 30-45 minutes before eating or taking other medications   -- Reviewed usual times of thyroid hormone changes  -- Reviewed that symptoms of hypothyroidism may not correlate with tsh, and a normal TSH is the goal of therapy. Symptoms are not a justification for over treatment.    Osteopenia  -- Continue following with orthopedics.     Goiter  -- Thyroid US.  -- Denies compressive symptoms.     IGT (impaired glucose tolerance)  -- Check A1c.     Vitamin D deficiency disease  -- Check vitamin D.     No follow-ups on file.    Case  discussed with Dr. Hurley.  Recommendations were discussed with the patient in detail  The patient verbalized understanding and agrees with the plan outlined as above.     8am labs - ACTH, cortisol (hold HC evening prior and take AM dose after labs), IGF1, prolactin, TSH, free T4  Follow up in pituitary clinic.     I spent 25 minutes face-to-face with the patient, over half of the visit was spent on counseling and/or coordinating the care of the patient.    Counseling includes:  Diagnostic results, impressions, recommendations   Prognosis   Risk and benefits of management/treatment options   Instructions for management treatment and or follow-up   Importance of compliance with management   Risk factor reduction   Patient education

## 2020-06-05 RX ORDER — LEVOTHYROXINE SODIUM 75 UG/1
75 TABLET ORAL
Qty: 30 TABLET | Refills: 0 | Status: SHIPPED | OUTPATIENT
Start: 2020-06-05 | End: 2020-06-15 | Stop reason: SDUPTHER

## 2020-06-08 ENCOUNTER — OFFICE VISIT (OUTPATIENT)
Dept: ENDOCRINOLOGY | Facility: CLINIC | Age: 65
End: 2020-06-08
Payer: MEDICARE

## 2020-06-08 DIAGNOSIS — M85.80 OSTEOPENIA, UNSPECIFIED LOCATION: ICD-10-CM

## 2020-06-08 DIAGNOSIS — R73.02 IGT (IMPAIRED GLUCOSE TOLERANCE): ICD-10-CM

## 2020-06-08 DIAGNOSIS — E22.0 ACROMEGALY: ICD-10-CM

## 2020-06-08 DIAGNOSIS — E04.9 GOITER: ICD-10-CM

## 2020-06-08 DIAGNOSIS — D49.7 PITUITARY TUMOR: Primary | ICD-10-CM

## 2020-06-08 DIAGNOSIS — E03.8 SECONDARY HYPOTHYROIDISM: ICD-10-CM

## 2020-06-08 DIAGNOSIS — E55.9 VITAMIN D DEFICIENCY DISEASE: ICD-10-CM

## 2020-06-08 PROCEDURE — 99214 OFFICE O/P EST MOD 30 MIN: CPT | Mod: 95,,, | Performed by: NURSE PRACTITIONER

## 2020-06-08 PROCEDURE — 99214 PR OFFICE/OUTPT VISIT, EST, LEVL IV, 30-39 MIN: ICD-10-PCS | Mod: 95,,, | Performed by: NURSE PRACTITIONER

## 2020-06-08 RX ORDER — OMEPRAZOLE 20 MG/1
20 CAPSULE, DELAYED RELEASE ORAL DAILY
COMMUNITY

## 2020-06-08 NOTE — ASSESSMENT & PLAN NOTE
-- Pituitary mass - growth hormone and prolactin secreting.  -- S/p two transphenoidal resection December 2010 and August 2011.  -- GK radiosurgery March 2012.  -- Lanreotide treatment ([id number]).   -- MRI due 10/2020 - follows with Dr. Carlos.   -- Current Meds:  Cabergoline 0.5mg one half tablet three times a week  HC (started 2/2016) 10mg in the AM 5mg in the PM   LT4 (follows free T4- started 8/2014) 75mcg one tablet daily  -- Sick day precautions discussed.  -- Check 8am labs - ACTH, cortisol (hold HC evening prior and take AM dose after labs), IGF1, prolactin, TSH, free T4.  -- Follow up in pituitary clinic.

## 2020-06-08 NOTE — Clinical Note
Follow up in pituitary clinic - im not sure if we need to send them a message. But she usually has appts same day as [facility] 8am labs whenever convenientThyroid US same day as appt with ware

## 2020-06-08 NOTE — ASSESSMENT & PLAN NOTE
-- Follow free T4.  -- Medication Changes:   CONTINUE: current regimen.   -- Goal is a free T4 ~1.  -- Avoid exogenous hyperthyroidism as this can accelerate bone loss and increase risk of CV complications.  -- Advised to take LT4 on an empty stomach with water and to wait 30-45 minutes before eating or taking other medications   -- Reviewed usual times of thyroid hormone changes  -- Reviewed that symptoms of hypothyroidism may not correlate with tsh, and a normal TSH is the goal of therapy. Symptoms are not a justification for over treatment.

## 2020-06-12 ENCOUNTER — LAB VISIT (OUTPATIENT)
Dept: LAB | Facility: HOSPITAL | Age: 65
End: 2020-06-12
Attending: NURSE PRACTITIONER
Payer: MEDICARE

## 2020-06-12 DIAGNOSIS — E22.0 ACROMEGALY: ICD-10-CM

## 2020-06-12 DIAGNOSIS — R73.02 IGT (IMPAIRED GLUCOSE TOLERANCE): ICD-10-CM

## 2020-06-12 DIAGNOSIS — D49.7 PITUITARY TUMOR: ICD-10-CM

## 2020-06-12 DIAGNOSIS — E55.9 VITAMIN D DEFICIENCY DISEASE: ICD-10-CM

## 2020-06-12 DIAGNOSIS — E03.8 SECONDARY HYPOTHYROIDISM: ICD-10-CM

## 2020-06-12 LAB
25(OH)D3+25(OH)D2 SERPL-MCNC: 47 NG/ML (ref 30–96)
ALBUMIN SERPL BCP-MCNC: 3.6 G/DL (ref 3.5–5.2)
ALP SERPL-CCNC: 68 U/L (ref 55–135)
ALT SERPL W/O P-5'-P-CCNC: 27 U/L (ref 10–44)
ANION GAP SERPL CALC-SCNC: 9 MMOL/L (ref 8–16)
AST SERPL-CCNC: 14 U/L (ref 10–40)
BILIRUB SERPL-MCNC: 0.6 MG/DL (ref 0.1–1)
BUN SERPL-MCNC: 16 MG/DL (ref 8–23)
CALCIUM SERPL-MCNC: 8.8 MG/DL (ref 8.7–10.5)
CHLORIDE SERPL-SCNC: 105 MMOL/L (ref 95–110)
CO2 SERPL-SCNC: 25 MMOL/L (ref 23–29)
CORTIS SERPL-MCNC: 9 UG/DL (ref 4.3–22.4)
CREAT SERPL-MCNC: 0.8 MG/DL (ref 0.5–1.4)
EST. GFR  (AFRICAN AMERICAN): >60 ML/MIN/1.73 M^2
EST. GFR  (NON AFRICAN AMERICAN): >60 ML/MIN/1.73 M^2
ESTIMATED AVG GLUCOSE: 105 MG/DL (ref 68–131)
GLUCOSE SERPL-MCNC: 87 MG/DL (ref 70–110)
HBA1C MFR BLD HPLC: 5.3 % (ref 4.5–6.2)
POTASSIUM SERPL-SCNC: 3 MMOL/L (ref 3.5–5.1)
PROLACTIN SERPL IA-MCNC: 6.2 NG/ML (ref 5.2–26.5)
PROT SERPL-MCNC: 6.5 G/DL (ref 6–8.4)
SODIUM SERPL-SCNC: 139 MMOL/L (ref 136–145)
T4 FREE SERPL-MCNC: 1.1 NG/DL (ref 0.71–1.51)
TSH SERPL DL<=0.005 MIU/L-ACNC: 0.01 UIU/ML (ref 0.34–5.6)

## 2020-06-12 PROCEDURE — 83036 HEMOGLOBIN GLYCOSYLATED A1C: CPT

## 2020-06-12 PROCEDURE — 80053 COMPREHEN METABOLIC PANEL: CPT

## 2020-06-12 PROCEDURE — 82306 VITAMIN D 25 HYDROXY: CPT

## 2020-06-12 PROCEDURE — 82024 ASSAY OF ACTH: CPT

## 2020-06-12 PROCEDURE — 82533 TOTAL CORTISOL: CPT

## 2020-06-12 PROCEDURE — 84305 ASSAY OF SOMATOMEDIN: CPT

## 2020-06-12 PROCEDURE — 84146 ASSAY OF PROLACTIN: CPT

## 2020-06-12 PROCEDURE — 84439 ASSAY OF FREE THYROXINE: CPT

## 2020-06-12 PROCEDURE — 84443 ASSAY THYROID STIM HORMONE: CPT

## 2020-06-15 DIAGNOSIS — E03.8 SECONDARY HYPOTHYROIDISM: Primary | ICD-10-CM

## 2020-06-15 DIAGNOSIS — E22.0 ACROMEGALY: ICD-10-CM

## 2020-06-15 DIAGNOSIS — D49.7 PITUITARY TUMOR: ICD-10-CM

## 2020-06-15 DIAGNOSIS — M85.80 OSTEOPENIA, UNSPECIFIED LOCATION: ICD-10-CM

## 2020-06-15 RX ORDER — LEVOTHYROXINE SODIUM 75 UG/1
75 TABLET ORAL
Qty: 90 TABLET | Refills: 5 | Status: SHIPPED | OUTPATIENT
Start: 2020-06-15 | End: 2020-10-02 | Stop reason: SDUPTHER

## 2020-06-15 RX ORDER — HYDROCORTISONE 10 MG/1
TABLET ORAL
Qty: 270 TABLET | Refills: 1 | Status: SHIPPED | OUTPATIENT
Start: 2020-06-15 | End: 2020-06-30 | Stop reason: SDUPTHER

## 2020-06-15 NOTE — TELEPHONE ENCOUNTER
Call and confirmed allergies.  Pt states side effect of high dose of methylprednisolone is severe headache and confirmed she is requesting refill on hydrocortisone tablets.

## 2020-06-16 ENCOUNTER — PATIENT MESSAGE (OUTPATIENT)
Dept: ENDOCRINOLOGY | Facility: CLINIC | Age: 65
End: 2020-06-16

## 2020-06-16 LAB
ACTH PLAS-MCNC: 13 PG/ML (ref 0–46)
IGF-I SERPL-MCNC: 93 NG/ML (ref 35–201)
IGF-I Z-SCORE SERPL: -0.13 SD

## 2020-06-17 ENCOUNTER — TELEPHONE (OUTPATIENT)
Dept: ENDOCRINOLOGY | Facility: CLINIC | Age: 65
End: 2020-06-17

## 2020-06-17 NOTE — TELEPHONE ENCOUNTER
----- Message from Samra Su NP sent at 2020  9:53 AM CDT -----  Please call the patient with the followin. Please confirm she help her hydrocortisone the night prior and morning of her labs?  Thank you,   Samra

## 2020-06-18 ENCOUNTER — OFFICE VISIT (OUTPATIENT)
Dept: GASTROENTEROLOGY | Facility: CLINIC | Age: 65
End: 2020-06-18
Payer: MEDICARE

## 2020-06-18 ENCOUNTER — PATIENT MESSAGE (OUTPATIENT)
Dept: ENDOCRINOLOGY | Facility: CLINIC | Age: 65
End: 2020-06-18

## 2020-06-18 VITALS
BODY MASS INDEX: 27.16 KG/M2 | TEMPERATURE: 98 F | HEART RATE: 63 BPM | SYSTOLIC BLOOD PRESSURE: 131 MMHG | OXYGEN SATURATION: 98 % | RESPIRATION RATE: 16 BRPM | HEIGHT: 66 IN | DIASTOLIC BLOOD PRESSURE: 78 MMHG | WEIGHT: 169 LBS

## 2020-06-18 DIAGNOSIS — K21.9 GASTROESOPHAGEAL REFLUX DISEASE, ESOPHAGITIS PRESENCE NOT SPECIFIED: Primary | ICD-10-CM

## 2020-06-18 DIAGNOSIS — K21.9 GERD (GASTROESOPHAGEAL REFLUX DISEASE): ICD-10-CM

## 2020-06-18 DIAGNOSIS — F41.9 ANXIETY: ICD-10-CM

## 2020-06-18 DIAGNOSIS — K21.9 GASTROESOPHAGEAL REFLUX DISEASE WITHOUT ESOPHAGITIS: ICD-10-CM

## 2020-06-18 DIAGNOSIS — R10.13 EPIGASTRIC PAIN: ICD-10-CM

## 2020-06-18 DIAGNOSIS — Z90.49 HISTORY OF CHOLECYSTECTOMY: ICD-10-CM

## 2020-06-18 DIAGNOSIS — Z01.818 PREOP TESTING: Primary | ICD-10-CM

## 2020-06-18 PROCEDURE — 3008F PR BODY MASS INDEX (BMI) DOCUMENTED: ICD-10-PCS | Mod: CPTII,S$GLB,, | Performed by: INTERNAL MEDICINE

## 2020-06-18 PROCEDURE — 1101F PR PT FALLS ASSESS DOC 0-1 FALLS W/OUT INJ PAST YR: ICD-10-PCS | Mod: CPTII,S$GLB,, | Performed by: INTERNAL MEDICINE

## 2020-06-18 PROCEDURE — 99204 PR OFFICE/OUTPT VISIT, NEW, LEVL IV, 45-59 MIN: ICD-10-PCS | Mod: S$GLB,,, | Performed by: INTERNAL MEDICINE

## 2020-06-18 PROCEDURE — 99999 PR PBB SHADOW E&M-EST. PATIENT-LVL IV: ICD-10-PCS | Mod: PBBFAC,,, | Performed by: INTERNAL MEDICINE

## 2020-06-18 PROCEDURE — 3008F BODY MASS INDEX DOCD: CPT | Mod: CPTII,S$GLB,, | Performed by: INTERNAL MEDICINE

## 2020-06-18 PROCEDURE — 1101F PT FALLS ASSESS-DOCD LE1/YR: CPT | Mod: CPTII,S$GLB,, | Performed by: INTERNAL MEDICINE

## 2020-06-18 PROCEDURE — 99204 OFFICE O/P NEW MOD 45 MIN: CPT | Mod: S$GLB,,, | Performed by: INTERNAL MEDICINE

## 2020-06-18 PROCEDURE — 99999 PR PBB SHADOW E&M-EST. PATIENT-LVL IV: CPT | Mod: PBBFAC,,, | Performed by: INTERNAL MEDICINE

## 2020-06-18 RX ORDER — ALPRAZOLAM 0.25 MG/1
0.25 TABLET ORAL EVERY 6 HOURS PRN
Qty: 100 TABLET | Refills: 0 | Status: SHIPPED | OUTPATIENT
Start: 2020-06-18 | End: 2022-09-21 | Stop reason: SDUPTHER

## 2020-06-18 RX ORDER — RIVAROXABAN 10 MG/1
TABLET, FILM COATED ORAL
COMMUNITY
Start: 2020-05-22

## 2020-06-18 NOTE — PATIENT INSTRUCTIONS
She will continue her reflux regimen and the omeprazole.  She follows up with the endocrinologist.  She is scheduled for upper endoscopy.  She will use the Xanax 0.25 mg as needed for the speech stress and anxiety.  She continues her other medications vitamins and minerals.

## 2020-06-18 NOTE — TELEPHONE ENCOUNTER
Spoke with the patient.   Reports she did take her HC the night prior to her labs.  All other labs unremarkable on current dosages.

## 2020-06-18 NOTE — LETTER
Cardiac Clearance Form        Date : 2020  Dr. Clarke,    Please provide us with anticoagulant therapy clearance on Ms. Mary Kay Schwartz.  : 1955.        Patient will be scheduled for an EGD under General/MAC anesthesia on 2020.  Patient is taking Xarelto.    Please fax this clearance including what date to being holding Xarelto prior to procedure and any necessary instructions/test results to [phone].    Thank you for your help in this matter.    Sincerely,  Elizabeth Sánchez LPN        Phone- [phone]  Fax- [phone]

## 2020-06-18 NOTE — PROGRESS NOTES
Subjective:       Patient ID: Mary Kay Schwartz is a 65 y.o. female.    Chief Complaint: Diarrhea (has cleared up but it was off and on since June 7) and Abdominal Pain    Approximate month ago she developed epigastric pain with pyrosis nausea episode of vomiting.  Associated with occasional diarrhea and postprandial bloating.  Her PCP put her on the omeprazole and told that she had gastritis.  She has a history gastroesophageal reflux.  She denies a precipitating factor.  She denies dysphagia aspiration hematemesis hematochezia jaundice or bleeding.  She is under lot of stress with her .  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  In the past she has uses the Xanax 0.25 mg occasionally to handle the anxiety episodes.  She has occasional abdominal bloating but has daily bowel movements.  She does not associate her symptoms with the specific food or medication or activity.  Her weight has remained stable.      Allergies:  Review of patient's allergies indicates:   Allergen Reactions    Methylprednisolone      Other reaction(s): Headache       Medications:    Current Outpatient Medications:     acetaminophen (TYLENOL) 500 MG tablet, Take 500 mg by mouth every 6 (six) hours as needed for Pain., Disp: , Rfl:     biotin 5,000 mcg TbDL, Take by mouth., Disp: , Rfl:     cabergoline (DOSTINEX) 0.5 mg tablet, Take one-half tablet by  mouth three times a week, Disp: 16 tablet, Rfl: 6    CALCIUM CARBONATE/VITAMIN D3 (VITAMIN D-3 ORAL), Take 2,000 mg by mouth 2 (two) times daily., Disp: , Rfl:     calcium citrate-vitamin D3 315-200 mg (CALCIUM CITRATE + D) 315-200 mg-unit per tablet, Take by mouth. 1 Tablet Oral Every day, Disp: , Rfl:     cetirizine (ZYRTEC) 10 MG tablet, Take by mouth. 1 Tablet Oral Every day, Disp: , Rfl:     hydrocortisone (CORTEF) 10 MG Tab, TAKE 1 TABLET BY MOUTH IN THE MORNING AND TAKE 1/2 TABLET IN THE EVENING DAILY, Disp: 270 tablet, Rfl: 1    levothyroxine (SYNTHROID) 75 MCG tablet, Take  1 tablet (75 mcg total) by mouth before breakfast., Disp: 90 tablet, Rfl: 5    magnesium 250 mg Tab, Take by mouth. 1-2 Tablet Oral daily, Disp: , Rfl:     multivitamin capsule, Take 1 capsule by mouth once daily.  , Disp: , Rfl:     omeprazole (PRILOSEC) 20 MG capsule, Take 20 mg by mouth once daily., Disp: , Rfl:     pseudoephedrine (SUDAFED) 120 mg 12 hr tablet, Take 120 mg by mouth every 12 (twelve) hours., Disp: , Rfl:     selenium 200 mcg Tab, Take by mouth., Disp: , Rfl:     simethicone (MYLICON) 80 MG chewable tablet, Take 80 mg by mouth every 6 (six) hours as needed for Flatulence., Disp: , Rfl:     XARELTO 10 mg Tab, , Disp: , Rfl:     ALPRAZolam (XANAX) 0.25 MG tablet, Take 1 tablet (0.25 mg total) by mouth every 6 (six) hours as needed for Anxiety., Disp: 100 tablet, Rfl: 0    Past Medical History:   Diagnosis Date    Acromegaly     Allergy     VITAMIN D DEFICIENCY    DVT (deep venous thrombosis)     Factor V Leiden mutation     Goiter 10/18/2016    IGT (impaired glucose tolerance)     Menopause present     Migraine headache     Osteoporosis, unspecified     Pituitary macroadenoma     s/p transphenoidal surgery on 12/8/10 by Dr. Hernandez; s/p transphenoidal surgery by Dr. Carlos on  8/23/2011; s/p radiosurgery with 14 Gy to the 50% isodose  line on 3/15/2012    Prolactinoma     Protein C deficiency     Prothrombin gene mutation     Vitamin D deficiency disease        Past Surgical History:   Procedure Laterality Date    BRAIN SURGERY      RADIOSURGERY/PITUIRAY RESECTION    CHOLECYSTECTOMY      difficult intubation      PITUITARY SURGERY      TOTAL HIP ARTHROPLASTY      Right Hip         Review of Systems   Constitutional: Negative for appetite change, fever and unexpected weight change.   HENT: Negative for trouble swallowing.         No jaundice.   Respiratory: Negative for cough, shortness of breath and wheezing.         She is a former smoker.  She denies dysphagia aspiration  hemoptysis chronic cough chronic sputum production or dyspnea on exertion.   Cardiovascular: Negative for chest pain.   Endocrine:        She is followed by the endocrinologist.  She has had surgery for pituitary tumor.  Additionally she is followed for thyroid goiter.  The recent evaluation was reviewed.  She is clinically stable with the current medications.   Musculoskeletal: Positive for arthralgias. Negative for back pain and neck pain.   Skin: Negative for pallor and rash.   Neurological: Negative for dizziness, seizures, syncope, speech difficulty, weakness and numbness.   Hematological: Negative for adenopathy.   Psychiatric/Behavioral: Negative for confusion.        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  Over the years the Xanax is helped the severe anxiety episodes codes described as a tremors apprehension and a sensation of impending doom.  She has a week's use the Xanax correctly.  She does not use other drugs or alcohol.       Objective:      Physical Exam  Vitals signs reviewed.   Constitutional:       Appearance: She is well-developed.      Comments: Well-nourished well-hydrated afebrile nonicteric white female.  She is oriented x3.  She is normocephalic.  Pupils are normal.  She can relate her history and answer questions appropriately.   HENT:      Head: Normocephalic.   Eyes:      Pupils: Pupils are equal, round, and reactive to light.   Neck:      Musculoskeletal: Normal range of motion and neck supple.      Thyroid: No thyromegaly.      Trachea: No tracheal deviation.   Cardiovascular:      Rate and Rhythm: Normal rate and regular rhythm.      Heart sounds: Normal heart sounds.   Pulmonary:      Effort: Pulmonary effort is normal.      Breath sounds: Normal breath sounds.   Abdominal:      General: Bowel sounds are normal. There is distension.      Palpations: Abdomen is soft. There is no mass.      Tenderness: There is abdominal tenderness. There is no right CVA  tenderness, left CVA tenderness, guarding or rebound.      Hernia: No hernia is present.   Musculoskeletal: Normal range of motion.      Comments: She can ambulate normally.  She can go from the sitting to the standing position without difficulty.   Lymphadenopathy:      Cervical: No cervical adenopathy.   Skin:     General: Skin is warm and dry.   Neurological:      Mental Status: She is alert and oriented to person, place, and time.      Cranial Nerves: No cranial nerve deficit.   Psychiatric:         Behavior: Behavior normal.           Plan:       Preop testing  -     CBC auto differential; Future; Expected date: 06/18/2020  -     Basic metabolic panel; Future; Expected date: 06/18/2020  -     COVID-19 Routine Screening; Future; Expected date: 06/21/2020    Anxiety  -     ALPRAZolam (XANAX) 0.25 MG tablet; Take 1 tablet (0.25 mg total) by mouth every 6 (six) hours as needed for Anxiety.  Dispense: 100 tablet; Refill: 0    History of cholecystectomy    Gastroesophageal reflux disease without esophagitis    Epigastric pain      .  She will make a food diary and avoid the offending foods.  She will be scheduled for upper endoscopy.  She has good health knowledge.  She understands the procedure.  Specifically she realizes there is an extremely small incidence of bleeding perforation or aspiration.  She continues her nutritious diet with fiber supplements to produced daily bowel movements.  She will uses Xanax as needed for the severe anxiety.  The [facility] records have been requested.

## 2020-06-30 DIAGNOSIS — E22.0 ACROMEGALY: ICD-10-CM

## 2020-06-30 DIAGNOSIS — E03.8 SECONDARY HYPOTHYROIDISM: ICD-10-CM

## 2020-06-30 DIAGNOSIS — D49.7 PITUITARY TUMOR: ICD-10-CM

## 2020-06-30 DIAGNOSIS — M85.80 OSTEOPENIA, UNSPECIFIED LOCATION: ICD-10-CM

## 2020-06-30 RX ORDER — HYDROCORTISONE 10 MG/1
TABLET ORAL
Qty: 270 TABLET | Refills: 1 | Status: SHIPPED | OUTPATIENT
Start: 2020-06-30 | End: 2020-08-24 | Stop reason: SDUPTHER

## 2020-06-30 NOTE — TELEPHONE ENCOUNTER
----- Message from Brandie Lawrence sent at 6/30/2020  3:20 PM CDT -----  Contact: tel: [phone]  Virginia Farris' [phone] order the Hydrocortisone tablets 10 mgs. .  Local pharmacy doesn't have it still after a month.  Pls send this in today.

## 2020-07-26 ENCOUNTER — LAB VISIT (OUTPATIENT)
Dept: LAB | Facility: HOSPITAL | Age: 65
End: 2020-07-26
Attending: INTERNAL MEDICINE
Payer: MEDICARE

## 2020-07-26 DIAGNOSIS — Z01.818 PREOP TESTING: ICD-10-CM

## 2020-07-26 LAB
ANION GAP SERPL CALC-SCNC: 7 MMOL/L (ref 8–16)
BASOPHILS # BLD AUTO: 0.04 K/UL (ref 0–0.2)
BASOPHILS NFR BLD: 0.5 % (ref 0–1.9)
BUN SERPL-MCNC: 17 MG/DL (ref 8–23)
CALCIUM SERPL-MCNC: 9.1 MG/DL (ref 8.7–10.5)
CHLORIDE SERPL-SCNC: 103 MMOL/L (ref 95–110)
CO2 SERPL-SCNC: 28 MMOL/L (ref 23–29)
CREAT SERPL-MCNC: 0.8 MG/DL (ref 0.5–1.4)
DIFFERENTIAL METHOD: ABNORMAL
EOSINOPHIL # BLD AUTO: 0.2 K/UL (ref 0–0.5)
EOSINOPHIL NFR BLD: 2.6 % (ref 0–8)
ERYTHROCYTE [DISTWIDTH] IN BLOOD BY AUTOMATED COUNT: 13.2 % (ref 11.5–14.5)
EST. GFR  (AFRICAN AMERICAN): >60 ML/MIN/1.73 M^2
EST. GFR  (NON AFRICAN AMERICAN): >60 ML/MIN/1.73 M^2
GLUCOSE SERPL-MCNC: 83 MG/DL (ref 70–110)
HCT VFR BLD AUTO: 36 % (ref 37–48.5)
HGB BLD-MCNC: 11.4 G/DL (ref 12–16)
IMM GRANULOCYTES # BLD AUTO: 0.04 K/UL (ref 0–0.04)
IMM GRANULOCYTES NFR BLD AUTO: 0.5 % (ref 0–0.5)
LYMPHOCYTES # BLD AUTO: 2.5 K/UL (ref 1–4.8)
LYMPHOCYTES NFR BLD: 32.5 % (ref 18–48)
MCH RBC QN AUTO: 28.9 PG (ref 27–31)
MCHC RBC AUTO-ENTMCNC: 31.7 G/DL (ref 32–36)
MCV RBC AUTO: 91 FL (ref 82–98)
MONOCYTES # BLD AUTO: 0.5 K/UL (ref 0.3–1)
MONOCYTES NFR BLD: 6.7 % (ref 4–15)
NEUTROPHILS # BLD AUTO: 4.3 K/UL (ref 1.8–7.7)
NEUTROPHILS NFR BLD: 57.2 % (ref 38–73)
NRBC BLD-RTO: 0 /100 WBC
PLATELET # BLD AUTO: 242 K/UL (ref 150–350)
PMV BLD AUTO: 10.5 FL (ref 9.2–12.9)
POTASSIUM SERPL-SCNC: 3.8 MMOL/L (ref 3.5–5.1)
RBC # BLD AUTO: 3.94 M/UL (ref 4–5.4)
SODIUM SERPL-SCNC: 138 MMOL/L (ref 136–145)
WBC # BLD AUTO: 7.57 K/UL (ref 3.9–12.7)

## 2020-07-26 PROCEDURE — 36415 COLL VENOUS BLD VENIPUNCTURE: CPT

## 2020-07-26 PROCEDURE — 80048 BASIC METABOLIC PNL TOTAL CA: CPT

## 2020-07-26 PROCEDURE — 85025 COMPLETE CBC W/AUTO DIFF WBC: CPT

## 2020-07-29 ENCOUNTER — ANESTHESIA (OUTPATIENT)
Dept: SURGERY | Facility: HOSPITAL | Age: 65
End: 2020-07-29
Payer: MEDICARE

## 2020-07-29 ENCOUNTER — HOSPITAL ENCOUNTER (OUTPATIENT)
Facility: HOSPITAL | Age: 65
Discharge: HOME | End: 2020-07-29
Attending: INTERNAL MEDICINE | Admitting: INTERNAL MEDICINE
Payer: MEDICARE

## 2020-07-29 ENCOUNTER — ANESTHESIA EVENT (OUTPATIENT)
Dept: SURGERY | Facility: HOSPITAL | Age: 65
End: 2020-07-29
Payer: MEDICARE

## 2020-07-29 VITALS
WEIGHT: 164 LBS | RESPIRATION RATE: 20 BRPM | TEMPERATURE: 98 F | HEIGHT: 66 IN | HEART RATE: 66 BPM | DIASTOLIC BLOOD PRESSURE: 61 MMHG | BODY MASS INDEX: 26.36 KG/M2 | SYSTOLIC BLOOD PRESSURE: 109 MMHG | OXYGEN SATURATION: 98 %

## 2020-07-29 DIAGNOSIS — K21.9 GERD (GASTROESOPHAGEAL REFLUX DISEASE): ICD-10-CM

## 2020-07-29 DIAGNOSIS — K21.9 GASTROESOPHAGEAL REFLUX DISEASE, ESOPHAGITIS PRESENCE NOT SPECIFIED: ICD-10-CM

## 2020-07-29 PROCEDURE — 88305 TISSUE EXAM BY PATHOLOGIST: CPT | Mod: 26,,, | Performed by: PATHOLOGY

## 2020-07-29 PROCEDURE — 25000003 PHARM REV CODE 250: Performed by: NURSE ANESTHETIST, CERTIFIED REGISTERED

## 2020-07-29 PROCEDURE — D9220A PRA ANESTHESIA: ICD-10-PCS | Mod: CRNA,,, | Performed by: NURSE ANESTHETIST, CERTIFIED REGISTERED

## 2020-07-29 PROCEDURE — 27201423 OPTIME MED/SURG SUP & DEVICES STERILE SUPPLY: Performed by: INTERNAL MEDICINE

## 2020-07-29 PROCEDURE — 88305 TISSUE EXAM BY PATHOLOGIST: CPT | Performed by: PATHOLOGY

## 2020-07-29 PROCEDURE — D9220A PRA ANESTHESIA: ICD-10-PCS | Mod: ANES,,, | Performed by: ANESTHESIOLOGY

## 2020-07-29 PROCEDURE — 37000009 HC ANESTHESIA EA ADD 15 MINS: Performed by: INTERNAL MEDICINE

## 2020-07-29 PROCEDURE — 43239 EGD BIOPSY SINGLE/MULTIPLE: CPT | Mod: ,,, | Performed by: INTERNAL MEDICINE

## 2020-07-29 PROCEDURE — D9220A PRA ANESTHESIA: Mod: ANES,,, | Performed by: ANESTHESIOLOGY

## 2020-07-29 PROCEDURE — 88305 TISSUE EXAM BY PATHOLOGIST: ICD-10-PCS | Mod: 26,,, | Performed by: PATHOLOGY

## 2020-07-29 PROCEDURE — 63600175 PHARM REV CODE 636 W HCPCS: Performed by: NURSE ANESTHETIST, CERTIFIED REGISTERED

## 2020-07-29 PROCEDURE — 43239 PR EGD, FLEX, W/BIOPSY, SGL/MULTI: ICD-10-PCS | Mod: ,,, | Performed by: INTERNAL MEDICINE

## 2020-07-29 PROCEDURE — 37000008 HC ANESTHESIA 1ST 15 MINUTES: Performed by: INTERNAL MEDICINE

## 2020-07-29 PROCEDURE — 43239 EGD BIOPSY SINGLE/MULTIPLE: CPT | Performed by: INTERNAL MEDICINE

## 2020-07-29 PROCEDURE — 43202 ESOPHAGOSCOPY FLEX BIOPSY: CPT | Performed by: INTERNAL MEDICINE

## 2020-07-29 PROCEDURE — D9220A PRA ANESTHESIA: Mod: CRNA,,, | Performed by: NURSE ANESTHETIST, CERTIFIED REGISTERED

## 2020-07-29 RX ORDER — FAMOTIDINE 10 MG/ML
INJECTION INTRAVENOUS
Status: DISCONTINUED
Start: 2020-07-29 | End: 2020-07-29 | Stop reason: HOSPADM

## 2020-07-29 RX ORDER — DIPHENHYDRAMINE HYDROCHLORIDE 50 MG/ML
12.5 INJECTION INTRAMUSCULAR; INTRAVENOUS
Status: DISCONTINUED | OUTPATIENT
Start: 2020-07-29 | End: 2020-07-29 | Stop reason: HOSPADM

## 2020-07-29 RX ORDER — LIDOCAINE HYDROCHLORIDE 10 MG/ML
1 INJECTION, SOLUTION EPIDURAL; INFILTRATION; INTRACAUDAL; PERINEURAL ONCE
Status: CANCELLED | OUTPATIENT
Start: 2020-07-29 | End: 2020-07-29

## 2020-07-29 RX ORDER — SODIUM CHLORIDE, SODIUM LACTATE, POTASSIUM CHLORIDE, CALCIUM CHLORIDE 600; 310; 30; 20 MG/100ML; MG/100ML; MG/100ML; MG/100ML
INJECTION, SOLUTION INTRAVENOUS CONTINUOUS PRN
Status: DISCONTINUED | OUTPATIENT
Start: 2020-07-29 | End: 2020-07-29

## 2020-07-29 RX ORDER — FAMOTIDINE 10 MG/ML
20 INJECTION INTRAVENOUS ONCE
Status: CANCELLED | OUTPATIENT
Start: 2020-07-29 | End: 2020-07-29

## 2020-07-29 RX ORDER — SODIUM CHLORIDE, SODIUM LACTATE, POTASSIUM CHLORIDE, CALCIUM CHLORIDE 600; 310; 30; 20 MG/100ML; MG/100ML; MG/100ML; MG/100ML
INJECTION, SOLUTION INTRAVENOUS CONTINUOUS
Status: CANCELLED | OUTPATIENT
Start: 2020-07-29

## 2020-07-29 RX ORDER — PROPOFOL 10 MG/ML
VIAL (ML) INTRAVENOUS
Status: DISCONTINUED | OUTPATIENT
Start: 2020-07-29 | End: 2020-07-29

## 2020-07-29 RX ORDER — ONDANSETRON 2 MG/ML
4 INJECTION INTRAMUSCULAR; INTRAVENOUS DAILY PRN
Status: DISCONTINUED | OUTPATIENT
Start: 2020-07-29 | End: 2020-07-29 | Stop reason: HOSPADM

## 2020-07-29 RX ORDER — SODIUM CHLORIDE, SODIUM LACTATE, POTASSIUM CHLORIDE, CALCIUM CHLORIDE 600; 310; 30; 20 MG/100ML; MG/100ML; MG/100ML; MG/100ML
125 INJECTION, SOLUTION INTRAVENOUS CONTINUOUS
Status: DISCONTINUED | OUTPATIENT
Start: 2020-07-29 | End: 2020-07-29 | Stop reason: HOSPADM

## 2020-07-29 RX ORDER — LIDOCAINE HYDROCHLORIDE 20 MG/ML
INJECTION, SOLUTION EPIDURAL; INFILTRATION; INTRACAUDAL; PERINEURAL
Status: DISCONTINUED | OUTPATIENT
Start: 2020-07-29 | End: 2020-07-29

## 2020-07-29 RX ADMIN — SODIUM CHLORIDE, POTASSIUM CHLORIDE, SODIUM LACTATE AND CALCIUM CHLORIDE: 600; 310; 30; 20 INJECTION, SOLUTION INTRAVENOUS at 09:07

## 2020-07-29 RX ADMIN — PROPOFOL 50 MG: 10 INJECTION, EMULSION INTRAVENOUS at 09:07

## 2020-07-29 RX ADMIN — LIDOCAINE HYDROCHLORIDE 50 MG: 20 INJECTION, SOLUTION EPIDURAL; INFILTRATION; INTRACAUDAL; PERINEURAL at 09:07

## 2020-07-29 NOTE — H&P
"Office Visit    6/18/2020  [facility] - Gastro     Willie Bingham MD  Gastroenterology  Preop testing +4 more  Dx  Diarrhea   , Abdominal Pain ; Referred by Aaareferral Self  Reason for Visit   Additional Documentation    Vitals:    /78 (BP Location: Left arm, Patient Position: Sitting, BP Method: Medium (Automatic))   Pulse 63   Temp 97.9 °F (36.6 °C) (Temporal)   Resp 16   Ht 5' 6" (1.676 m)   Wt 76.7 kg (169 lb)   SpO2 98%   BMI 27.28 kg/m²   BSA 1.89 m²      More Vitals   Flowsheets:    Anthropometrics      SmartForms:     [facility] AMB - FALL RISK      Encounter Info:    Billing Info,   History,   Allergies,   Detailed Report      Instructions       Follow up in about 1 month (around 7/18/2020).  She will continue her reflux regimen and the omeprazole.  She follows up with the endocrinologist.  She is scheduled for upper endoscopy.  She will use the Xanax 0.25 mg as needed for the speech stress and anxiety.  She continues her other medications vitamins and minerals.          After Visit Summary (Printed 6/18/2020)  Progress Notes  Willie Bingham MD (Physician)   Gastroenterology   6/18/2020 10:00 AM   Signed     Subjective:       Patient ID: Mary Kay Schwartz is a 65 y.o. female.     Chief Complaint: Diarrhea (has cleared up but it was off and on since June 7) and Abdominal Pain    Approximate month ago she developed epigastric pain with pyrosis nausea episode of vomiting.  Associated with occasional diarrhea and postprandial bloating.  Her PCP put her on the omeprazole and told that she had gastritis.  She has a history gastroesophageal reflux.  She denies a precipitating factor.  She denies dysphagia aspiration hematemesis hematochezia jaundice or bleeding.  She is under lot of stress with her .  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  In the past she has uses the Xanax 0.25 mg occasionally to handle the anxiety episodes.  She has occasional abdominal bloating but has " daily bowel movements.  She does not associate her symptoms with the specific food or medication or activity.  Her weight has remained stable.        Allergies:        Review of patient's allergies indicates:   Allergen Reactions    Methylprednisolone         Other reaction(s): Headache        Medications:    Current Outpatient Medications:     acetaminophen (TYLENOL) 500 MG tablet, Take 500 mg by mouth every 6 (six) hours as needed for Pain., Disp: , Rfl:     biotin 5,000 mcg TbDL, Take by mouth., Disp: , Rfl:     cabergoline (DOSTINEX) 0.5 mg tablet, Take one-half tablet by  mouth three times a week, Disp: 16 tablet, Rfl: 6    CALCIUM CARBONATE/VITAMIN D3 (VITAMIN D-3 ORAL), Take 2,000 mg by mouth 2 (two) times daily., Disp: , Rfl:     calcium citrate-vitamin D3 315-200 mg (CALCIUM CITRATE + D) 315-200 mg-unit per tablet, Take by mouth. 1 Tablet Oral Every day, Disp: , Rfl:     cetirizine (ZYRTEC) 10 MG tablet, Take by mouth. 1 Tablet Oral Every day, Disp: , Rfl:     hydrocortisone (CORTEF) 10 MG Tab, TAKE 1 TABLET BY MOUTH IN THE MORNING AND TAKE 1/2 TABLET IN THE EVENING DAILY, Disp: 270 tablet, Rfl: 1    levothyroxine (SYNTHROID) 75 MCG tablet, Take 1 tablet (75 mcg total) by mouth before breakfast., Disp: 90 tablet, Rfl: 5    magnesium 250 mg Tab, Take by mouth. 1-2 Tablet Oral daily, Disp: , Rfl:     multivitamin capsule, Take 1 capsule by mouth once daily.  , Disp: , Rfl:     omeprazole (PRILOSEC) 20 MG capsule, Take 20 mg by mouth once daily., Disp: , Rfl:     pseudoephedrine (SUDAFED) 120 mg 12 hr tablet, Take 120 mg by mouth every 12 (twelve) hours., Disp: , Rfl:     selenium 200 mcg Tab, Take by mouth., Disp: , Rfl:     simethicone (MYLICON) 80 MG chewable tablet, Take 80 mg by mouth every 6 (six) hours as needed for Flatulence., Disp: , Rfl:     XARELTO 10 mg Tab, , Disp: , Rfl:     ALPRAZolam (XANAX) 0.25 MG tablet, Take 1 tablet (0.25 mg total) by mouth every 6 (six) hours as needed  for Anxiety., Disp: 100 tablet, Rfl: 0          Past Medical History:   Diagnosis Date    Acromegaly      Allergy       VITAMIN D DEFICIENCY    DVT (deep venous thrombosis)      Factor V Leiden mutation      Goiter 10/18/2016    IGT (impaired glucose tolerance)      Menopause present      Migraine headache      Osteoporosis, unspecified      Pituitary macroadenoma       s/p transphenoidal surgery on 12/8/10 by Dr. Hernandez; s/p transphenoidal surgery by Dr. Carlos on  8/23/2011; s/p radiosurgery with 14 Gy to the 50% isodose  line on 3/15/2012    Prolactinoma      Protein C deficiency      Prothrombin gene mutation      Vitamin D deficiency disease                Past Surgical History:   Procedure Laterality Date    BRAIN SURGERY         RADIOSURGERY/PITUIRAY RESECTION    CHOLECYSTECTOMY        difficult intubation        PITUITARY SURGERY        TOTAL HIP ARTHROPLASTY         Right Hip           Review of Systems   Constitutional: Negative for appetite change, fever and unexpected weight change.   HENT: Negative for trouble swallowing.         No jaundice.   Respiratory: Negative for cough, shortness of breath and wheezing.         She is a former smoker.  She denies dysphagia aspiration hemoptysis chronic cough chronic sputum production or dyspnea on exertion.   Cardiovascular: Negative for chest pain.   Endocrine:        She is followed by the endocrinologist.  She has had surgery for pituitary tumor.  Additionally she is followed for thyroid goiter.  The recent evaluation was reviewed.  She is clinically stable with the current medications.   Musculoskeletal: Positive for arthralgias. Negative for back pain and neck pain.   Skin: Negative for pallor and rash.   Neurological: Negative for dizziness, seizures, syncope, speech difficulty, weakness and numbness.   Hematological: Negative for adenopathy.   Psychiatric/Behavioral: Negative for confusion.        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  Over the years the Xanax is helped the severe anxiety episodes codes described as a tremors apprehension and a sensation of impending doom.  She has a week's use the Xanax correctly.  She does not use other drugs or alcohol.       Objective:      Physical Exam  Vitals signs reviewed.   Constitutional:       Appearance: She is well-developed.      Comments: Well-nourished well-hydrated afebrile nonicteric white female.  She is oriented x3.  She is normocephalic.  Pupils are normal.  She can relate her history and answer questions appropriately.   HENT:      Head: Normocephalic.   Eyes:      Pupils: Pupils are equal, round, and reactive to light.   Neck:      Musculoskeletal: Normal range of motion and neck supple.      Thyroid: No thyromegaly.      Trachea: No tracheal deviation.   Cardiovascular:      Rate and Rhythm: Normal rate and regular rhythm.      Heart sounds: Normal heart sounds.   Pulmonary:      Effort: Pulmonary effort is normal.      Breath sounds: Normal breath sounds.   Abdominal:      General: Bowel sounds are normal. There is distension.      Palpations: Abdomen is soft. There is no mass.      Tenderness: There is abdominal tenderness. There is no right CVA tenderness, left CVA tenderness, guarding or rebound.      Hernia: No hernia is present.   Musculoskeletal: Normal range of motion.      Comments: She can ambulate normally.  She can go from the sitting to the standing position without difficulty.   Lymphadenopathy:      Cervical: No cervical adenopathy.   Skin:     General: Skin is warm and dry.   Neurological:      Mental Status: She is alert and oriented to person, place, and time.      Cranial Nerves: No cranial nerve deficit.   Psychiatric:         Behavior: Behavior normal.             Plan:       Preop testing  -     CBC auto differential; Future; Expected date: 06/18/2020  -     Basic metabolic panel; Future; Expected date: 06/18/2020  -     COVID-19 Routine  Screening; Future; Expected date: 06/21/2020     Anxiety  -     ALPRAZolam (XANAX) 0.25 MG tablet; Take 1 tablet (0.25 mg total) by mouth every 6 (six) hours as needed for Anxiety.  Dispense: 100 tablet; Refill: 0     History of cholecystectomy     Gastroesophageal reflux disease without esophagitis     Epigastric pain       .  She will make a food diary and avoid the offending foods.  She will be scheduled for upper endoscopy.  She has good health knowledge.  She understands the procedure.  Specifically she realizes there is an extremely small incidence of bleeding perforation or aspiration.  She continues her nutritious diet with fiber supplements to produced daily bowel movements.  She will uses Xanax as needed for the severe anxiety.  The [facility] records have been requested.         Other Notes     All notes  Communications       Letter  Active Diagnosis Review (HCC)    Active Diagnosis Review ([facility])   Not recorded   All Charges for This Encounter    Code Description Service Date Service Provider Modifiers Qty   29180 SC OFFICE/OUTPT VISIT,RICCARDO GOSS IV 6/18/2020 Willie Bingham MD S$GLB 1   [id number] SC PBB SHADOW E&M-EST. PATIENT-LVL IV 6/18/2020 Willie Bingham MD PBBFAC 1   1101F SC PT FALLS ASSESS DOC 0-1 FALLS W/OUT INJ PAST YR 6/18/2020 Willie Bingham MD S$GLB, CPTII 1   3008F SC BODY MASS INDEX (BMI) DOCUMENTED 6/18/2020 Willie Bingham MD S$GLB, CPTII 1   Level of Service    Level of Service   SC OFFICE/OUTPT VISIT,RICCARDO GOSS IV [66399]   BestPractice Advisories    Click to view BestPractice Advisory history   AVS Reports    Date/Time Report Action User   6/18/2020 10:37 AM After Visit Summary Printed Elizabeth Sánchez LPN   6/18/2020 10:31 AM After Visit Summary Automatically Generated Willie Bingham MD   Encounter-Level Documents - 06/18/2020:    After Visit Summary - Document on 6/18/2020 10:37 AM by Elizabeth Sánchez LPN: After Visit Summary  After Visit Summary - Document on 6/18/2020 10:31 AM by Willie Bingham MD:  After Visit Summary  Orders Placed       Basic metabolic panel     CBC auto differential     COVID-19 Routine Screening     All Encounter Results   Medication Changes       alprazolam 0.25 mg Oral Every 6 hours PRN      rivaroxaban             10 mg Tab, No dose, route, or frequency recorded.           Medication List   Fall Risk    Patient Mobility Status: Ambulatory   Number of falls in the past 12 months?: 0   Fall Risk?: No  Visit Diagnoses       Preop testing     Anxiety     History of cholecystectomy     Gastroesophageal reflux disease without esophagitis     Epigastric pain     Problem List   She is here for upper endoscopy.  She has good health knowledge.  She understands the procedure.  Specifically she realizes there is an extremely small incidence of bleeding perforation or aspiration.  She complains of pyrosis dyspepsia nausea and epigastric discomfort.  History and physical are unchanged.  Physical examination.  Well-nourished well-hydrated afebrile nonicteric white female.  She is normocephalic.  Pupils are normal.  Lungs reveal symmetrical respirations without rales rhonchi.  Heart reveals regular rhythm.  The abdomen is soft without organomegaly masses felt.  Bowel sounds are normal.  There is mild tenderness in the epigastrium.  Neurologic reveals she is oriented x3.

## 2020-07-29 NOTE — OP NOTE
Process procedure.  Esophageal gastroduodenoscopy with biopsies.  Indications pyrosis dyspepsia abdominal pain with nausea without vomiting jaundice or bleeding.  She has good health knowledge.  She understands the procedures of upper endoscopy.  Specifically she realizes there is an extremely small incidence of bleeding perforation or aspiration.  Anesthesia.  Monitored anesthesia coverage.  Procedure.  With the patient the procedure room and left lateral supine position.  The Olympus video upper endoscope was passed without difficulty.  The hyperemic gastroesophageal junction at 39-40 cm with the SQ junction at 39-40 cm.  The scope was passed in the stomach and retroflexed.  The cardia body and antrum were mildly hyperemic.  There is minimal retained secretions.  The friable pre-pyloric mucosa was biopsied.  Biopsies were obtained from the lesser curvature greater curvature and antrum.  There is minimal deformed the pylorus.  First and parts of the duodenum were hyperemic.  Patient tolerated the procedure well.  Impression 1.  Esophagitis LA grade A 2.  Gastritis.

## 2020-07-29 NOTE — [employer] PATIENT INSTRUCTIONS
Discharge Summary/Instructions after an Endoscopic Procedure  Patient Name: Mary Kay Schwartz  Patient MRN: [id number]  Patient YOB: 1955  Wednesday, July 29, 2020  Willie Bingham MD  RESTRICTIONS:  During your procedure today, you received medications for sedation.  These   medications may affect your judgment, balance and coordination.  Therefore,   for 24 hours, you have the following restrictions:   - DO NOT drive a car, operate machinery, make legal/financial decisions,   sign important papers or drink alcohol.    ACTIVITY:  Today: no heavy lifting, straining or running due to procedural   sedation/anesthesia.  The following day: return to full activity including work.  DIET:  Eat and drink normally unless instructed otherwise.     TREATMENT FOR COMMON SIDE EFFECTS:  - Mild abdominal pain, nausea, belching, bloating or excessive gas:  rest,   eat lightly and use a heating pad.  - Sore Throat: treat with throat lozenges and/or gargle with warm salt   water.  - Because air was used during the procedure, expelling large amounts of air   from your rectum or belching is normal.  - If a bowel prep was taken, you may not have a bowel movement for 1-3 days.    This is normal.  SYMPTOMS TO WATCH FOR AND REPORT TO YOUR PHYSICIAN:  1. Abdominal pain or bloating, other than gas cramps.  2. Chest pain.  3. Back pain.  4. Signs of infection such as: chills or fever occurring within 24 hours   after the procedure.  5. Rectal bleeding, which would show as bright red, maroon, or black stools.   (A tablespoon of blood from the rectum is not serious, especially if   hemorrhoids are present.)  6. Vomiting.  7. Weakness or dizziness.  GO DIRECTLY TO THE NEAREST EMERGENCY ROOM IF YOU HAVE ANY OF THE FOLLOWING:      Difficulty breathing              Chills and/or fever over 101 F   Persistent vomiting and/or vomiting blood   Severe abdominal pain   Severe chest pain   Black, tarry stools   Bleeding- more than one tablespoon   Any  other symptom or condition that you feel may need urgent attention  Your doctor recommends these additional instructions:  If any biopsies were taken, your doctors clinic will contact you in 1 to 2   weeks with any results.  - Discharge patient to home (ambulatory).   - Resume previous diet.  For questions, problems or results please call your physician - Willie Bingham MD at Work:  [phone].  [facility] EMERGENCY ROOM PHONE NUMBER: [phone]  IF A COMPLICATION OR EMERGENCY SITUATION ARISES AND YOU ARE UNABLE TO REACH   YOUR PHYSICIAN - GO DIRECTLY TO THE EMERGENCY ROOM.  MD Willie Chambers MD  7/29/2020 9:26:03 AM  This report has been verified and signed electronically.  [employer]

## 2020-07-29 NOTE — ANESTHESIA POSTPROCEDURE EVALUATION
Anesthesia Post Evaluation    Patient: Mary Kay Schwartz    Procedure(s) Performed: Procedure(s) (LRB):  EGD (ESOPHAGOGASTRODUODENOSCOPY) (N/A)    Final Anesthesia Type: general    Patient location during evaluation: PACU  Patient participation: Yes- Able to Participate  Level of consciousness: awake and alert  Post-procedure vital signs: reviewed and stable  Pain management: adequate  Airway patency: patent    PONV status at discharge: No PONV  Anesthetic complications: no      Cardiovascular status: blood pressure returned to baseline  Respiratory status: unassisted  Hydration status: euvolemic  Follow-up not needed.          Vitals Value Taken Time   /61 07/29/20 0950   Temp 36.6 °C (97.9 °F) 07/29/20 0924   Pulse 66 07/29/20 0950   Resp 20 07/29/20 0950   SpO2 98 % 07/29/20 0950         Event Time   Out of Recovery 09:40:10         Pain/China Score: China Score: 10 (7/29/2020  9:35 AM)  Modified [place] Score: 19 (7/29/2020  9:50 AM)

## 2020-07-29 NOTE — TRANSFER OF CARE
"Anesthesia Transfer of Care Note    Patient: Mary Kay Schwartz    Procedure(s) Performed: Procedure(s) (LRB):  EGD (ESOPHAGOGASTRODUODENOSCOPY) (N/A)    Patient location: PACU    Anesthesia Type: MAC    Transport from OR: Transported from OR on room air with adequate spontaneous ventilation    Post pain: adequate analgesia    Post assessment: no apparent anesthetic complications    Post vital signs: stable    Level of consciousness: awake, alert and oriented    Nausea/Vomiting: no nausea/vomiting    Complications: none    Transfer of care protocol was followed      Last vitals:   Visit Vitals  /67 (BP Location: Right arm, Patient Position: Lying)   Pulse 66   Temp 36.7 °C (98.1 °F) (Oral)   Resp 12   Ht 5' 6" (1.676 m)   Wt 74.4 kg (164 lb)   SpO2 98%   Breastfeeding No   BMI 26.47 kg/m²     "

## 2020-07-29 NOTE — DISCHARGE INSTRUCTIONS
Upper GI Endoscopy     During endoscopy, a long, flexible tube is used to view the inside of your upper GI tract.      Upper GI endoscopy allows your healthcare provider to look directly into the beginning of your gastrointestinal (GI) tract. The esophagus, stomach, and duodenum (the first part of the small intestine) make up the upper GI tract.   Before the exam  Follow these and any other instructions you are given before your endoscopy. If you dont follow the healthcare providers instructions carefully, the test may need to be canceled or done over:  · Don't eat or drink anything after midnight the night before your exam. If your exam is in the afternoon, drink only clear liquids in the morning. Don't eat or drink anything for 8 hours before the exam. In some cases, you may be able to take medicines with sips of water until 2 hours before the procedure. Speak with your healthcare provider about this.   · Bring your X-rays and any other test results you have.  · Because you will be sedated, arrange for an adult to drive you home after the exam.  · Tell your healthcare provider before the exam if you are taking any medicines or have any medical problems.  The procedure  Here is what to expect:  · You will lie on the endoscopy table. Usually patients lie on the left side.  · You will be monitored and given oxygen.  · Your throat may be numbed with a spray or gargle. You are given medicine through an intravenous (IV) line that will help you relax and remain comfortable. You may be awake or asleep during the procedure.  · The healthcare provider will put the endoscope in your mouth and down your esophagus. It is thinner than most pieces of food that you swallow. It will not affect your breathing. The medicine helps keep you from gagging.  · Air is put into your GI tract to expand it. It can make you burp.  · During the procedure, the healthcare provider can take biopsies (tissue samples), remove abnormalities,  such as polyps, or treat abnormalities through a variety of devices placed through the endoscope. You will not feel this.   · The endoscope carries images of your upper GI tract to a video screen. If you are awake, you may be able to look at the images.  · After the procedure is done, you will rest for a time. An adult must drive you home.  When to call your healthcare provider  Contact your healthcare provider if you have:  · Black or tarry stools, or blood in your stool  · Fever  · Pain in your belly that does not go away  · Nausea and vomiting, or vomiting blood   Date Last Reviewed: 7/1/2016  © 5839-5085 [employer]. [address] [place] [address], [place] [address]. All rights reserved. This information is not intended as a substitute for professional medical care. Always follow your healthcare professional's instructions.        Discharge Instructions: After Your Surgery  Youve just had surgery. During surgery, you were given medicine called anesthesia to keep you relaxed and free of pain. After surgery, you may have some pain or nausea. This is common. Here are some tips for feeling better and getting well after surgery.     Stay on schedule with your medicine.   Going home  Your healthcare provider will show you how to take care of yourself when you go home. He or she will also answer your questions. Have an adult family member or friend drive you home. For the first 24 hours after your surgery:  · Do not drive or use heavy equipment.  · Do not make important decisions or sign legal papers.  · Do not drink alcohol.  · Have someone stay with you, if needed. He or she can watch for problems and help keep you safe.  Be sure to go to all follow-up visits with your healthcare provider. And rest after your surgery for as long as your healthcare provider tells you to.  Coping with pain  If you have pain after surgery, pain medicine will help you feel better. Take it as told, before pain becomes severe. Also,  ask your healthcare provider or pharmacist about other ways to control pain. This might be with heat, ice, or relaxation. And follow any other instructions your surgeon or nurse gives you.  Tips for taking pain medicine  To get the best relief possible, remember these points:  · Pain medicines can upset your stomach. Taking them with a little food may help.  · Most pain relievers taken by mouth need at least 20 to 30 minutes to start to work.  · Taking medicine on a schedule can help you remember to take it. Try to time your medicine so that you can take it before starting an activity. This might be before you get dressed, go for a walk, or sit down for dinner.  · Constipation is a common side effect of pain medicines. Call your healthcare provider before taking any medicines such as laxatives or stool softeners to help ease constipation. Also ask if you should skip any foods. Drinking lots of fluids and eating foods such as fruits and vegetables that are high in fiber can also help. Remember, do not take laxatives unless your surgeon has prescribed them.  · Drinking alcohol and taking pain medicine can cause dizziness and slow your breathing. It can even be deadly. Do not drink alcohol while taking pain medicine.  · Pain medicine can make you react more slowly to things. Do not drive or run machinery while taking pain medicine.  Your healthcare provider may tell you to take acetaminophen to help ease your pain. Ask him or her how much you are supposed to take each day. Acetaminophen or other pain relievers may interact with your prescription medicines or other over-the-counter (OTC) medicines. Some prescription medicines have acetaminophen and other ingredients. Using both prescription and OTC acetaminophen for pain can cause you to overdose. Read the labels on your OTC medicines with care. This will help you to clearly know the list of ingredients, how much to take, and any warnings. It may also help you not take  too much acetaminophen. If you have questions or do not understand the information, ask your pharmacist or healthcare provider to explain it to you before you take the OTC medicine.  Managing nausea  Some people have an upset stomach after surgery. This is often because of anesthesia, pain, or pain medicine, or the stress of surgery. These tips will help you handle nausea and eat healthy foods as you get better. If you were on a special food plan before surgery, ask your healthcare provider if you should follow it while you get better. These tips may help:  · Do not push yourself to eat. Your body will tell you when to eat and how much.  · Start off with clear liquids and soup. They are easier to digest.  · Next try semi-solid foods, such as mashed potatoes, applesauce, and gelatin, as you feel ready.  · Slowly move to solid foods. Dont eat fatty, rich, or spicy foods at first.  · Do not force yourself to have 3 large meals a day. Instead eat smaller amounts more often.  · Take pain medicines with a small amount of solid food, such as crackers or toast, to avoid nausea.     Call your surgeon if  · You still have pain an hour after taking medicine. The medicine may not be strong enough.  · You feel too sleepy, dizzy, or groggy. The medicine may be too strong.  · You have side effects like nausea, vomiting, or skin changes, such as rash, itching, or hives.       If you have obstructive sleep apnea  You were given anesthesia medicine during surgery to keep you comfortable and free of pain. After surgery, you may have more apnea spells because of this medicine and other medicines you were given. The spells may last longer than usual.   At home:  · Keep using the continuous positive airway pressure (CPAP) device when you sleep. Unless your healthcare provider tells you not to, use it when you sleep, day or night. CPAP is a common device used to treat obstructive sleep apnea.  · Talk with your provider before taking any  pain medicine, muscle relaxants, or sedatives. Your provider will tell you about the possible dangers of taking these medicines.  Date Last Reviewed: 12/1/2016  © 1890-2678 [employer]. [address] [place] [address], [place] [address]. All rights reserved. This information is not intended as a substitute for professional medical care. Always follow your healthcare professional's instructions.

## 2020-07-29 NOTE — DISCHARGE SUMMARY
Upper endoscopy revealed gastroesophageal reflux and gastritis.  Biopsies are pending for H pylori.  She will make a food diary continue her current medications.  She continues her vitamins and minerals.  She continues her reflux regimen.  She continues her bowel program with additional fiber to produced daily bowel movements.  She follows up with her endocrinologist .  She has a followup upon the office.

## 2020-07-29 NOTE — ANESTHESIA PREPROCEDURE EVALUATION
07/29/2020  Mary Kay Schwartz is a 65 y.o., female.    Anesthesia Evaluation    I have reviewed the Patient Summary Reports.    I have reviewed the Nursing Notes. I have reviewed the NPO Status.   I have reviewed the Medications.   Steroids Taken In Past Year: Cortisone    Review of Systems  Anesthesia Hx:  Hx of Anesthetic complications (difficult intubation)    Social:  Former Smoker    Hematology/Oncology:  Hematology Normal   Oncology Normal     EENT/Dental:EENT/Dental Normal   Pulmonary:  Pulmonary Normal    Hepatic/GI:   GERD    Neurological:   Headaches    Endocrine:   Hypothyroidism Panhypopit       Physical Exam  General:  Well nourished    Airway/Jaw/Neck:  Airway Findings: Mouth Opening: Normal Tongue: Normal  General Airway Assessment: Adult  Mallampati: I  TM Distance: 4 - 6 cm      Dental:  Dental Findings:    Chest/Lungs:  Chest/Lungs Findings: Clear to auscultation     Heart/Vascular:  Heart Findings: Rate: Normal  Rhythm: Regular Rhythm        Mental Status:  Mental Status Findings:  Cooperative, Alert and Oriented         Anesthesia Plan  Type of Anesthesia, risks & benefits discussed:  Anesthesia Type:  general  Patient's Preference:   Intra-op Monitoring Plan: standard ASA monitors  Intra-op Monitoring Plan Comments:   Post Op Pain Control Plan: IV/PO Opioids PRN  Post Op Pain Control Plan Comments:   Induction:   IV  Beta Blocker:  Patient is not currently on a Beta-Blocker (No further documentation required).       Informed Consent: Patient understands risks and agrees with Anesthesia plan.  Questions answered. Anesthesia consent signed with patient.  ASA Score: 3     Day of Surgery Review of History & Physical: I have interviewed and examined the patient. I have reviewed the patient's H&P dated:            Ready For Surgery From Anesthesia Perspective.

## 2020-07-31 LAB
FINAL PATHOLOGIC DIAGNOSIS: NORMAL
GROSS: NORMAL

## 2020-08-10 ENCOUNTER — OFFICE VISIT (OUTPATIENT)
Dept: GASTROENTEROLOGY | Facility: CLINIC | Age: 65
End: 2020-08-10
Payer: MEDICARE

## 2020-08-10 VITALS
BODY MASS INDEX: 27.16 KG/M2 | HEIGHT: 66 IN | OXYGEN SATURATION: 97 % | RESPIRATION RATE: 18 BRPM | WEIGHT: 169 LBS | DIASTOLIC BLOOD PRESSURE: 81 MMHG | HEART RATE: 66 BPM | SYSTOLIC BLOOD PRESSURE: 136 MMHG

## 2020-08-10 DIAGNOSIS — Z90.49 HISTORY OF CHOLECYSTECTOMY: ICD-10-CM

## 2020-08-10 DIAGNOSIS — R10.9 ABDOMINAL PAIN, UNSPECIFIED ABDOMINAL LOCATION: Primary | ICD-10-CM

## 2020-08-10 DIAGNOSIS — K57.90 DIVERTICULOSIS: ICD-10-CM

## 2020-08-10 DIAGNOSIS — K21.9 GASTROESOPHAGEAL REFLUX DISEASE, ESOPHAGITIS PRESENCE NOT SPECIFIED: ICD-10-CM

## 2020-08-10 PROCEDURE — 99999 PR PBB SHADOW E&M-EST. PATIENT-LVL IV: ICD-10-PCS | Mod: PBBFAC,,, | Performed by: INTERNAL MEDICINE

## 2020-08-10 PROCEDURE — 1101F PR PT FALLS ASSESS DOC 0-1 FALLS W/OUT INJ PAST YR: ICD-10-PCS | Mod: CPTII,S$GLB,, | Performed by: INTERNAL MEDICINE

## 2020-08-10 PROCEDURE — 3008F BODY MASS INDEX DOCD: CPT | Mod: CPTII,S$GLB,, | Performed by: INTERNAL MEDICINE

## 2020-08-10 PROCEDURE — 3008F PR BODY MASS INDEX (BMI) DOCUMENTED: ICD-10-PCS | Mod: CPTII,S$GLB,, | Performed by: INTERNAL MEDICINE

## 2020-08-10 PROCEDURE — 99213 OFFICE O/P EST LOW 20 MIN: CPT | Mod: S$GLB,,, | Performed by: INTERNAL MEDICINE

## 2020-08-10 PROCEDURE — 99213 PR OFFICE/OUTPT VISIT, EST, LEVL III, 20-29 MIN: ICD-10-PCS | Mod: S$GLB,,, | Performed by: INTERNAL MEDICINE

## 2020-08-10 PROCEDURE — 1101F PT FALLS ASSESS-DOCD LE1/YR: CPT | Mod: CPTII,S$GLB,, | Performed by: INTERNAL MEDICINE

## 2020-08-10 PROCEDURE — 99999 PR PBB SHADOW E&M-EST. PATIENT-LVL IV: CPT | Mod: PBBFAC,,, | Performed by: INTERNAL MEDICINE

## 2020-08-10 RX ORDER — SUCRALFATE 1 G/1
1 TABLET ORAL
Qty: 90 TABLET | Refills: 11 | Status: SHIPPED | OUTPATIENT
Start: 2020-08-10 | End: 2021-06-11

## 2020-08-10 NOTE — PATIENT INSTRUCTIONS
She was found to have gastroesophageal reflux.  The gastric biopsies were negative for H pylori.  She has a history of H pylori infection and stool will be obtained for H pylori.  She continues her reflux regimen current medications.  She takes the omeprazole in a daily basis.  She follows up with her endocrinologist.  She she will make a food diary and avoid the offending foods.  She started on Carafate 1 g before meals.

## 2020-08-10 NOTE — PROGRESS NOTES
Upper endoscopy revealed gastroesophageal reflux and gastritis.  The biopsies were negative for H pylori.  In the past she has had an H pylori infection.  The biopsies were limited because of her easy bleeding.  She denies hematemesis hematochezia jaundice or bleeding.  She is having breakthrough symptoms on the PPI.  She she cannot pinpoint a specific cause.  She is under some stress with her ill .  She denies hematemesis hematochezia jaundice or bleeding.  She generally has daily bowel movements.  She denies fever chills.  She is evaluated by her endocrinologist she states that her pituitary replacement hormones are doing well.  She is scheduled for follow-up MRI.  She denies headache or neurologic symptoms.  Subjective:       Patient ID: Mary Kay Schwartz is a 65 y.o. female.    Chief Complaint: Follow-up    HPI    Allergies:  Review of patient's allergies indicates:   Allergen Reactions    Methylprednisolone      Other reaction(s): Headache       Medications:    Current Outpatient Medications:     acetaminophen (TYLENOL) 500 MG tablet, Take 500 mg by mouth every 6 (six) hours as needed for Pain., Disp: , Rfl:     ALPRAZolam (XANAX) 0.25 MG tablet, Take 1 tablet (0.25 mg total) by mouth every 6 (six) hours as needed for Anxiety., Disp: 100 tablet, Rfl: 0    biotin 5,000 mcg TbDL, Take by mouth., Disp: , Rfl:     cabergoline (DOSTINEX) 0.5 mg tablet, Take one-half tablet by  mouth three times a week, Disp: 16 tablet, Rfl: 6    CALCIUM CARBONATE/VITAMIN D3 (VITAMIN D-3 ORAL), Take 2,000 mg by mouth 2 (two) times daily., Disp: , Rfl:     calcium citrate-vitamin D3 315-200 mg (CALCIUM CITRATE + D) 315-200 mg-unit per tablet, Take by mouth. 1 Tablet Oral Every day, Disp: , Rfl:     cetirizine (ZYRTEC) 10 MG tablet, Take by mouth. 1 Tablet Oral Every day, Disp: , Rfl:     hydrocortisone (CORTEF) 10 MG Tab, TAKE 1 TABLET BY MOUTH IN THE MORNING AND TAKE 1/2 TABLET IN THE EVENING DAILY, Disp: 270 tablet,  Rfl: 1    levothyroxine (SYNTHROID) 75 MCG tablet, Take 1 tablet (75 mcg total) by mouth before breakfast., Disp: 90 tablet, Rfl: 5    magnesium 250 mg Tab, Take by mouth. 1-2 Tablet Oral daily, Disp: , Rfl:     multivitamin capsule, Take 1 capsule by mouth once daily.  , Disp: , Rfl:     omeprazole (PRILOSEC) 20 MG capsule, Take 20 mg by mouth once daily., Disp: , Rfl:     pseudoephedrine (SUDAFED) 120 mg 12 hr tablet, Take 120 mg by mouth every 12 (twelve) hours., Disp: , Rfl:     selenium 200 mcg Tab, Take by mouth., Disp: , Rfl:     simethicone (MYLICON) 80 MG chewable tablet, Take 80 mg by mouth every 6 (six) hours as needed for Flatulence., Disp: , Rfl:     XARELTO 10 mg Tab, , Disp: , Rfl:     sucralfate (CARAFATE) 1 gram tablet, Take 1 tablet (1 g total) by mouth 3 (three) times daily before meals., Disp: 90 tablet, Rfl: 11    Past Medical History:   Diagnosis Date    Acromegaly     Allergy     VITAMIN D DEFICIENCY    DVT (deep venous thrombosis)     Factor V Leiden mutation     Goiter 10/18/2016    IGT (impaired glucose tolerance)     Menopause present     Migraine headache     Osteoporosis, unspecified     Pituitary macroadenoma     s/p transphenoidal surgery on 12/8/10 by Dr. Hernandez; s/p transphenoidal surgery by Dr. Carlos on  8/23/2011; s/p radiosurgery with 14 Gy to the 50% isodose  line on 3/15/2012    Prolactinoma     Protein C deficiency     Prothrombin gene mutation     Vitamin D deficiency disease        Past Surgical History:   Procedure Laterality Date    BRAIN SURGERY      RADIOSURGERY/PITUIRAY RESECTION    CHOLECYSTECTOMY      difficult intubation      ESOPHAGOGASTRODUODENOSCOPY N/A 7/29/2020    Procedure: EGD (ESOPHAGOGASTRODUODENOSCOPY);  Surgeon: Willie Bingham MD;  [facility]: [facility];  Service: Endoscopy;  Laterality: N/A;    PITUITARY SURGERY      TOTAL HIP ARTHROPLASTY      Right Hip         Review of Systems   Constitutional: Negative for appetite change,  fever and unexpected weight change.   HENT: Negative for trouble swallowing.         No jaundice.   Respiratory: Negative for cough, shortness of breath and wheezing.         She is a former cigarette smoker.  She denies dysphagia aspiration hemoptysis chronic cough or chronic sputum production.  She denies dyspnea with exertion.   Cardiovascular: Negative for chest pain.        She denies irregular heartbeat or exertional dyspnea   Gastrointestinal: Positive for nausea. Negative for abdominal distention, abdominal pain, anal bleeding, blood in stool, constipation, diarrhea and rectal pain.        She has had a cholecystectomy.  She has a history of diverticulosis but is having daily bowel movements.  She was told off on further colonoscopies at this point.   Musculoskeletal: Positive for arthralgias and back pain. Negative for neck pain.        She has had hip surgery.  She has mild joint symptoms.  She is on treatment for deep venous thrombosis and followed by Dr. barajas   Skin: Negative for pallor and rash.   Neurological: Negative for dizziness, seizures, syncope, speech difficulty, weakness and numbness.        She has had pituitary gland surgery.  She has a schedule MRI.  She is on replacement therapy and states that her endocrinologist has followed her and she is doing well.   Hematological: Negative for adenopathy.   Psychiatric/Behavioral: Negative for confusion.        She has anxiety episodes.  They are related to her 's behavior and alcoholism.  At times she becomes extremely anxious and has a feeling that she has impending gloom       Objective:      Physical Exam  Vitals signs reviewed.   Constitutional:       Appearance: Normal appearance. She is well-developed.      Comments: Well-nourished well-hydrated nonicteric afebrile white female.  She is oriented x3.  She is sitting comfortably in the chair.  She can relate her history and answer questions appropriately.  She is normocephalic.  Pupils  are normal.   HENT:      Head: Normocephalic.   Eyes:      Pupils: Pupils are equal, round, and reactive to light.   Neck:      Musculoskeletal: Normal range of motion and neck supple.      Thyroid: No thyromegaly.      Trachea: No tracheal deviation.   Cardiovascular:      Rate and Rhythm: Normal rate and regular rhythm.      Heart sounds: Normal heart sounds.   Pulmonary:      Effort: Pulmonary effort is normal.      Breath sounds: Normal breath sounds.   Abdominal:      General: Bowel sounds are normal. There is no distension.      Palpations: Abdomen is soft. There is no mass.      Tenderness: There is abdominal tenderness. There is no guarding or rebound.      Hernia: No hernia is present.      Comments: The abdomen is soft with mild tenderness in the epigastrium.  Organomegaly masses are not detected.  Bowel sounds are normal.   Musculoskeletal: Normal range of motion.      Comments: She can ambulate normally.  She can go from the sitting the standing position without difficulty.   Lymphadenopathy:      Cervical: No cervical adenopathy.   Skin:     General: Skin is warm and dry.   Neurological:      Mental Status: She is alert and oriented to person, place, and time.      Cranial Nerves: No cranial nerve deficit.   Psychiatric:         Behavior: Behavior normal.           Plan:       Abdominal pain, unspecified abdominal location  -     H. pylori antigen, stool; Future; Expected date: 08/10/2020    Gastroesophageal reflux disease, esophagitis presence not specified    History of cholecystectomy    Diverticulosis    Other orders  -     sucralfate (CARAFATE) 1 gram tablet; Take 1 tablet (1 g total) by mouth 3 (three) times daily before meals.  Dispense: 90 tablet; Refill: 11     She will continue her reflux regimen.  She was started on Carafate 1 g before meals.  She will make a food diary and avoid the offending foods.  She continues her nutritious diet with adequate fiber to avoid constipation.  She continues  her current medications vitamins and minerals.  She will follow-up with her refer endocrinologist.

## 2020-08-11 ENCOUNTER — LAB VISIT (OUTPATIENT)
Dept: LAB | Facility: HOSPITAL | Age: 65
End: 2020-08-11
Attending: INTERNAL MEDICINE
Payer: MEDICARE

## 2020-08-11 DIAGNOSIS — R10.9 ABDOMINAL PAIN, UNSPECIFIED ABDOMINAL LOCATION: ICD-10-CM

## 2020-08-11 PROCEDURE — 87338 HPYLORI STOOL AG IA: CPT

## 2020-08-17 LAB — H PYLORI AG STL QL IA: NOT DETECTED

## 2020-08-19 ENCOUNTER — TELEPHONE (OUTPATIENT)
Dept: NEUROSURGERY | Facility: CLINIC | Age: 65
End: 2020-08-19

## 2020-08-19 DIAGNOSIS — D35.2 PITUITARY ADENOMA: ICD-10-CM

## 2020-08-19 DIAGNOSIS — D35.2 PROLACTINOMA: Primary | ICD-10-CM

## 2020-08-19 DIAGNOSIS — E22.0 ACROMEGALY: ICD-10-CM

## 2020-08-24 DIAGNOSIS — D49.7 PITUITARY TUMOR: ICD-10-CM

## 2020-08-24 DIAGNOSIS — E22.0 ACROMEGALY: ICD-10-CM

## 2020-08-24 DIAGNOSIS — E03.8 SECONDARY HYPOTHYROIDISM: ICD-10-CM

## 2020-08-24 DIAGNOSIS — M85.80 OSTEOPENIA, UNSPECIFIED LOCATION: ICD-10-CM

## 2020-08-24 RX ORDER — HYDROCORTISONE 10 MG/1
TABLET ORAL
Qty: 270 TABLET | Refills: 1 | Status: SHIPPED | OUTPATIENT
Start: 2020-08-24 | End: 2021-08-23

## 2020-10-02 DIAGNOSIS — E03.8 SECONDARY HYPOTHYROIDISM: ICD-10-CM

## 2020-10-02 RX ORDER — LEVOTHYROXINE SODIUM 75 UG/1
75 TABLET ORAL
Qty: 90 TABLET | Refills: 3 | Status: SHIPPED | OUTPATIENT
Start: 2020-10-02 | End: 2021-09-27 | Stop reason: SDUPTHER

## 2020-10-02 NOTE — TELEPHONE ENCOUNTER
----- Message from Tobias Gomez sent at 10/2/2020  9:57 AM CDT -----  Regarding: Prescription Refil  levothyroxine (SYNTHROID) 75 MCG tablet      Via Kanchan Mcfarland with [employer]     [phone] ( Office )

## 2020-10-12 ENCOUNTER — HOSPITAL ENCOUNTER (OUTPATIENT)
Dept: RADIOLOGY | Facility: HOSPITAL | Age: 65
Discharge: HOME | End: 2020-10-12
Attending: NEUROLOGICAL SURGERY
Payer: MEDICARE

## 2020-10-12 DIAGNOSIS — D35.2 PROLACTINOMA: ICD-10-CM

## 2020-10-12 DIAGNOSIS — D35.2 PITUITARY ADENOMA: ICD-10-CM

## 2020-10-12 DIAGNOSIS — E22.0 ACROMEGALY: ICD-10-CM

## 2020-10-12 PROCEDURE — 25500020 PHARM REV CODE 255: Performed by: NEUROLOGICAL SURGERY

## 2020-10-12 PROCEDURE — 70553 MRI BRAIN STEM W/O & W/DYE: CPT | Mod: 26,,, | Performed by: RADIOLOGY

## 2020-10-12 PROCEDURE — 70553 MRI PITUITARY W W/O CONTRAST: ICD-10-PCS | Mod: 26,,, | Performed by: RADIOLOGY

## 2020-10-12 PROCEDURE — 70553 MRI BRAIN STEM W/O & W/DYE: CPT | Mod: TC

## 2020-10-12 PROCEDURE — A9585 GADOBUTROL INJECTION: HCPCS | Performed by: NEUROLOGICAL SURGERY

## 2020-10-12 RX ORDER — GADOBUTROL 604.72 MG/ML
8 INJECTION INTRAVENOUS
Status: COMPLETED | OUTPATIENT
Start: 2020-10-12 | End: 2020-10-12

## 2020-10-12 RX ADMIN — GADOBUTROL 8 ML: 604.72 INJECTION INTRAVENOUS at 11:10

## 2020-10-13 ENCOUNTER — OFFICE VISIT (OUTPATIENT)
Dept: NEUROSURGERY | Facility: CLINIC | Age: 65
End: 2020-10-13
Payer: MEDICARE

## 2020-10-13 DIAGNOSIS — E22.0 ACROMEGALY: Primary | ICD-10-CM

## 2020-10-13 DIAGNOSIS — D35.2 PROLACTINOMA: ICD-10-CM

## 2020-10-13 PROCEDURE — 1101F PR PT FALLS ASSESS DOC 0-1 FALLS W/OUT INJ PAST YR: ICD-10-PCS | Mod: CPTII,95,, | Performed by: NEUROLOGICAL SURGERY

## 2020-10-13 PROCEDURE — 99214 PR OFFICE/OUTPT VISIT, EST, LEVL IV, 30-39 MIN: ICD-10-PCS | Mod: 95,,, | Performed by: NEUROLOGICAL SURGERY

## 2020-10-13 PROCEDURE — 99214 OFFICE O/P EST MOD 30 MIN: CPT | Mod: 95,,, | Performed by: NEUROLOGICAL SURGERY

## 2020-10-13 PROCEDURE — 1101F PT FALLS ASSESS-DOCD LE1/YR: CPT | Mod: CPTII,95,, | Performed by: NEUROLOGICAL SURGERY

## 2020-10-13 NOTE — PATIENT INSTRUCTIONS
I have personally reviewed the MRI Pituitary with the pt which shows no significant change in the appearance of the sella/suprasellar region compared to the prior exam with continued enhancing lesion as before.    I will schedule the patient for 2 year follow up with MRI brain.

## 2020-10-13 NOTE — PROGRESS NOTES
Subjective:   I, rKistina Leach, attest that this documentation has been prepared under the direction and in the presence of Quentin Carlos MD.     Patient ID: Mary Kay Schwartz is a 65 y.o. female     Chief Complaint: No chief complaint on file.    The patient location is: Home  The chief complaint leading to consultation is: pituitary adenoma  Visit type: Virtual visit with synchronous audio and video.  Total time spent with patient: 8 minutes  Each patient to whom he or she provides medical services by telemedicine is:  (1) informed of the relationship between the physician and patient and the respective role of any other health care provider with respect to management of the patient; and (2) notified that he or she may decline to receive medical services by telemedicine and may withdraw from such care at any time.      HPI  Ms. Mary Kay Schwartz is a pleasant 65 y.o. woman with pituitary adenoma, acromegaly, and prolactinoma  who presents today for her 2 year follow up with MRI brain. At the time of the last office visit, on 10/09/2018, the pt had no complaints and tumor was stable.    Pt states she has been doing well in the interim and has no complaints at this time. Her  is a . Pt is on hydrocortisone.     Review of Systems   Constitutional: Negative for activity change, fatigue, fever and unexpected weight change.   HENT: Negative for facial swelling.    Eyes: Negative.    Respiratory: Negative.    Cardiovascular: Negative.    Gastrointestinal: Negative for diarrhea, nausea and vomiting.   Genitourinary: Negative.    Musculoskeletal: Negative for back pain, joint swelling, myalgias and neck pain.   Neurological: Negative for dizziness, weakness, numbness and headaches.   Psychiatric/Behavioral: Negative.       Past Medical History:   Diagnosis Date    Acromegaly     Allergy     VITAMIN D DEFICIENCY    DVT (deep venous thrombosis)     Factor V Leiden mutation     Goiter 10/18/2016    IGT  (impaired glucose tolerance)     Menopause present     Migraine headache     Osteoporosis, unspecified     Pituitary macroadenoma     s/p transphenoidal surgery on 12/8/10 by Dr. Hernandez; s/p transphenoidal surgery by Dr. Carlos on  8/23/2011; s/p radiosurgery with 14 Gy to the 50% isodose  line on 3/15/2012    Prolactinoma     Protein C deficiency     Prothrombin gene mutation     Vitamin D deficiency disease        Objective:    There were no vitals filed for this visit.   Physical Exam  Constitutional:       General: She is not in acute distress.     Appearance: She is well-developed.   HENT:      Head: Normocephalic and atraumatic.   Neurological:      Mental Status: She is alert and oriented to person, place, and time.            IMAGING:  MRI Pituitary W W/O Contrast (10/12/2020) shows no significant change in the appearance of the sella/suprasellar region compared to the prior exam with continued enhancing lesion as before.    Pituitary labs from 06/12/2020 are within normal limits except for TSH that is low at 0.014.     Hgb A1C is normal .    I have personally reviewed the images with the pt.      I, Dr. Quentin Carlos, personally performed the services described in this documentation. All medical record entries made by the scribe, Kristina Leach, were at my direction and in my presence.  I have reviewed the chart and agree that the record reflects my personal performance and is accurate and complete. Quentin Carlos MD. 10/13/2020    Assessment:       1. Acromegaly.  2. Prolactinoma.   Plan:   I have personally reviewed the MRI Pituitary with the pt which shows no significant change in the appearance of the sella/suprasellar region compared to the prior exam with continued enhancing lesion as before.    I will schedule the patient for 2 year follow up with MRI brain.

## 2020-11-10 ENCOUNTER — OFFICE VISIT (OUTPATIENT)
Dept: GASTROENTEROLOGY | Facility: CLINIC | Age: 65
End: 2020-11-10
Payer: MEDICARE

## 2020-11-10 VITALS
DIASTOLIC BLOOD PRESSURE: 77 MMHG | OXYGEN SATURATION: 97 % | SYSTOLIC BLOOD PRESSURE: 146 MMHG | HEART RATE: 72 BPM | WEIGHT: 169 LBS | HEIGHT: 66 IN | TEMPERATURE: 98 F | RESPIRATION RATE: 16 BRPM | BODY MASS INDEX: 27.16 KG/M2

## 2020-11-10 DIAGNOSIS — K57.90 DIVERTICULOSIS: ICD-10-CM

## 2020-11-10 DIAGNOSIS — K21.9 GASTROESOPHAGEAL REFLUX DISEASE, UNSPECIFIED WHETHER ESOPHAGITIS PRESENT: ICD-10-CM

## 2020-11-10 DIAGNOSIS — Z90.49 HISTORY OF CHOLECYSTECTOMY: Primary | ICD-10-CM

## 2020-11-10 PROCEDURE — 99999 PR PBB SHADOW E&M-EST. PATIENT-LVL IV: CPT | Mod: PBBFAC,,, | Performed by: INTERNAL MEDICINE

## 2020-11-10 PROCEDURE — 1101F PT FALLS ASSESS-DOCD LE1/YR: CPT | Mod: CPTII,S$GLB,, | Performed by: INTERNAL MEDICINE

## 2020-11-10 PROCEDURE — 99999 PR PBB SHADOW E&M-EST. PATIENT-LVL IV: ICD-10-PCS | Mod: PBBFAC,,, | Performed by: INTERNAL MEDICINE

## 2020-11-10 PROCEDURE — 3008F PR BODY MASS INDEX (BMI) DOCUMENTED: ICD-10-PCS | Mod: CPTII,S$GLB,, | Performed by: INTERNAL MEDICINE

## 2020-11-10 PROCEDURE — 99214 PR OFFICE/OUTPT VISIT, EST, LEVL IV, 30-39 MIN: ICD-10-PCS | Mod: S$GLB,,, | Performed by: INTERNAL MEDICINE

## 2020-11-10 PROCEDURE — 1101F PR PT FALLS ASSESS DOC 0-1 FALLS W/OUT INJ PAST YR: ICD-10-PCS | Mod: CPTII,S$GLB,, | Performed by: INTERNAL MEDICINE

## 2020-11-10 PROCEDURE — 99214 OFFICE O/P EST MOD 30 MIN: CPT | Mod: S$GLB,,, | Performed by: INTERNAL MEDICINE

## 2020-11-10 PROCEDURE — 3008F BODY MASS INDEX DOCD: CPT | Mod: CPTII,S$GLB,, | Performed by: INTERNAL MEDICINE

## 2020-11-11 NOTE — PROGRESS NOTES
Subjective:       Patient ID: Mary Kay Schwartz is a 65 y.o. female.    Chief Complaint: Follow-up    Emotionally she is doing better.  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  The  apparently is doing better and not consuming excessive alcohol.  She takes an occasional Xanax which has controlled the anxiety episodes.  She has a history of diverticulosis and gastroesophageal reflux.  She denies GI symptoms.  She denies hematemesis hematochezia dysphagia or aspiration.  She is having daily bowel movements.  She is not straining.  She denies fever chills and her weight is stable.  She is physically active.  She recently was evaluated by her endocrinologist and surgeon.  She has had pituitary surgery.  She states that her hormone levels are normal.  She takes her medications as prescribed.      Allergies:  Review of patient's allergies indicates:   Allergen Reactions    Methylprednisolone      Other reaction(s): Headache       Medications:    Current Outpatient Medications:     acetaminophen (TYLENOL) 500 MG tablet, Take 500 mg by mouth every 6 (six) hours as needed for Pain., Disp: , Rfl:     ALPRAZolam (XANAX) 0.25 MG tablet, Take 1 tablet (0.25 mg total) by mouth every 6 (six) hours as needed for Anxiety., Disp: 100 tablet, Rfl: 0    biotin 5,000 mcg TbDL, Take by mouth., Disp: , Rfl:     cabergoline (DOSTINEX) 0.5 mg tablet, Take one-half tablet by  mouth three times a week, Disp: 16 tablet, Rfl: 6    CALCIUM CARBONATE/VITAMIN D3 (VITAMIN D-3 ORAL), Take 2,000 mg by mouth 2 (two) times daily., Disp: , Rfl:     cetirizine (ZYRTEC) 10 MG tablet, Take by mouth. 1 Tablet Oral Every day, Disp: , Rfl:     hydrocortisone (CORTEF) 10 MG Tab, TAKE 1 TABLET BY MOUTH IN THE MORNING AND TAKE 1/2 TABLET IN THE EVENING DAILY, Disp: 270 tablet, Rfl: 1    levothyroxine (SYNTHROID) 75 MCG tablet, Take 1 tablet (75 mcg total) by mouth before breakfast., Disp: 90 tablet, Rfl: 3    magnesium 250 mg Tab, Take by mouth.  1-2 Tablet Oral daily, Disp: , Rfl:     multivitamin capsule, Take 1 capsule by mouth once daily.  , Disp: , Rfl:     omeprazole (PRILOSEC) 20 MG capsule, Take 20 mg by mouth once daily., Disp: , Rfl:     pseudoephedrine (SUDAFED) 120 mg 12 hr tablet, Take 120 mg by mouth every 12 (twelve) hours., Disp: , Rfl:     selenium 200 mcg Tab, Take by mouth., Disp: , Rfl:     simethicone (MYLICON) 80 MG chewable tablet, Take 80 mg by mouth every 6 (six) hours as needed for Flatulence., Disp: , Rfl:     sucralfate (CARAFATE) 1 gram tablet, Take 1 tablet (1 g total) by mouth 3 (three) times daily before meals., Disp: 90 tablet, Rfl: 11    XARELTO 10 mg Tab, , Disp: , Rfl:     Past Medical History:   Diagnosis Date    Acromegaly     Allergy     VITAMIN D DEFICIENCY    DVT (deep venous thrombosis)     Factor V Leiden mutation     Goiter 10/18/2016    IGT (impaired glucose tolerance)     Menopause present     Migraine headache     Osteoporosis, unspecified     Pituitary macroadenoma     s/p transphenoidal surgery on 12/8/10 by Dr. Hernandez; s/p transphenoidal surgery by Dr. Carlos on  8/23/2011; s/p radiosurgery with 14 Gy to the 50% isodose  line on 3/15/2012    Prolactinoma     Protein C deficiency     Prothrombin gene mutation     Vitamin D deficiency disease        Past Surgical History:   Procedure Laterality Date    BRAIN SURGERY      RADIOSURGERY/PITUIRAY RESECTION    CHOLECYSTECTOMY      difficult intubation      ESOPHAGOGASTRODUODENOSCOPY N/A 7/29/2020    Procedure: EGD (ESOPHAGOGASTRODUODENOSCOPY);  Surgeon: Willie Bingham MD;  Location: [facility];  Service: Endoscopy;  Laterality: N/A;    PITUITARY SURGERY      TOTAL HIP ARTHROPLASTY      Right Hip         Review of Systems   Constitutional: Negative for appetite change, fever and unexpected weight change.   HENT: Negative for trouble swallowing.         No jaundice.   Respiratory: Negative for cough, shortness of breath and wheezing.         She  is a former smoker.  She does not use tobacco alcohol.  She denies dysphagia aspiration hemoptysis chronic cough chronic sputum production or dyspnea on exertion.   Cardiovascular: Negative for chest pain.        She has had a cholecystectomy.  She is having daily bowel movements and denies upper GI symptoms.  She continues her medications as prescribed.  She is taking her vitamins and minerals.  She does not want further GI evaluation at this point.  She denies exertional chest pain or rhythm disturbance.   Gastrointestinal: Negative for abdominal distention, abdominal pain, anal bleeding, blood in stool, constipation, diarrhea, nausea, rectal pain and vomiting.        She has had a cholecystectomy.   Musculoskeletal: Positive for arthralgias and back pain. Negative for neck pain.        She has had orthopedic surgery and had surgery.   Skin: Negative for pallor and rash.   Neurological: Negative for dizziness, seizures, syncope, speech difficulty, weakness and numbness.   Hematological: Negative for adenopathy.   Psychiatric/Behavioral: Negative for confusion.        She uses an occasional Xanax for the severe anxiety episodes.  She is able to use her medicine correctly and able to perform her usual activities.       Objective:      Physical Exam  Vitals signs reviewed.   Constitutional:       Appearance: She is well-developed.      Comments: Well-nourished well-hydrated afebrile nonicteric white female.  She is sitting comfortably in the chair.  She is breathing normally.  She appears to be oriented x3.  She can relate her history and answer questions appropriately.  She is normocephalic.  Pupils are normal.   HENT:      Head: Normocephalic.   Eyes:      Pupils: Pupils are equal, round, and reactive to light.   Neck:      Musculoskeletal: Normal range of motion and neck supple.      Thyroid: No thyromegaly.      Trachea: No tracheal deviation.   Cardiovascular:      Rate and Rhythm: Normal rate and regular  rhythm.      Heart sounds: Normal heart sounds.   Pulmonary:      Effort: Pulmonary effort is normal.      Breath sounds: Normal breath sounds.   Abdominal:      General: There is no distension.      Palpations: Abdomen is soft. There is no mass.      Tenderness: There is no abdominal tenderness. There is no right CVA tenderness, left CVA tenderness, guarding or rebound.      Hernia: No hernia is present.   Musculoskeletal: Normal range of motion.      Comments: She ambulates normally.  She can go from the sitting the standing position without difficulty.   Lymphadenopathy:      Cervical: No cervical adenopathy.   Skin:     General: Skin is warm and dry.   Neurological:      Mental Status: She is alert and oriented to person, place, and time.      Cranial Nerves: No cranial nerve deficit.   Psychiatric:         Behavior: Behavior normal.           Plan:       History of cholecystectomy    Diverticulosis    Gastroesophageal reflux disease, unspecified whether esophagitis present     She will continue her reflux regimen.  She will make a food diary and avoid the offending foods.  She follows up with her endocrinologist.  She continues her bowel program with additional fiber and/or MiraLax.  She does not want further GI evaluation at this point.  She will continue her vitamins and minerals.  If necessary she can use the MiraLax.  She uses Xanax as needed for the stress  events and anxiety.

## 2021-06-11 ENCOUNTER — OFFICE VISIT (OUTPATIENT)
Dept: GASTROENTEROLOGY | Facility: CLINIC | Age: 66
End: 2021-06-11
Payer: MEDICARE

## 2021-06-11 VITALS
OXYGEN SATURATION: 95 % | SYSTOLIC BLOOD PRESSURE: 116 MMHG | BODY MASS INDEX: 27.16 KG/M2 | RESPIRATION RATE: 14 BRPM | WEIGHT: 169 LBS | HEIGHT: 66 IN | DIASTOLIC BLOOD PRESSURE: 67 MMHG | TEMPERATURE: 98 F | HEART RATE: 64 BPM

## 2021-06-11 DIAGNOSIS — Z90.49 HISTORY OF CHOLECYSTECTOMY: Primary | ICD-10-CM

## 2021-06-11 DIAGNOSIS — K21.9 GASTROESOPHAGEAL REFLUX DISEASE, UNSPECIFIED WHETHER ESOPHAGITIS PRESENT: ICD-10-CM

## 2021-06-11 DIAGNOSIS — K57.90 DIVERTICULOSIS: ICD-10-CM

## 2021-06-11 PROCEDURE — 1159F MED LIST DOCD IN RCRD: CPT | Mod: S$GLB,,, | Performed by: INTERNAL MEDICINE

## 2021-06-11 PROCEDURE — 99214 OFFICE O/P EST MOD 30 MIN: CPT | Mod: S$GLB,,, | Performed by: INTERNAL MEDICINE

## 2021-06-11 PROCEDURE — 99999 PR PBB SHADOW E&M-EST. PATIENT-LVL IV: ICD-10-PCS | Mod: PBBFAC,,, | Performed by: INTERNAL MEDICINE

## 2021-06-11 PROCEDURE — 1126F AMNT PAIN NOTED NONE PRSNT: CPT | Mod: S$GLB,,, | Performed by: INTERNAL MEDICINE

## 2021-06-11 PROCEDURE — 1126F PR PAIN SEVERITY QUANTIFIED, NO PAIN PRESENT: ICD-10-PCS | Mod: S$GLB,,, | Performed by: INTERNAL MEDICINE

## 2021-06-11 PROCEDURE — 99999 PR PBB SHADOW E&M-EST. PATIENT-LVL IV: CPT | Mod: PBBFAC,,, | Performed by: INTERNAL MEDICINE

## 2021-06-11 PROCEDURE — 1101F PR PT FALLS ASSESS DOC 0-1 FALLS W/OUT INJ PAST YR: ICD-10-PCS | Mod: CPTII,S$GLB,, | Performed by: INTERNAL MEDICINE

## 2021-06-11 PROCEDURE — 3008F PR BODY MASS INDEX (BMI) DOCUMENTED: ICD-10-PCS | Mod: CPTII,S$GLB,, | Performed by: INTERNAL MEDICINE

## 2021-06-11 PROCEDURE — 99214 PR OFFICE/OUTPT VISIT, EST, LEVL IV, 30-39 MIN: ICD-10-PCS | Mod: S$GLB,,, | Performed by: INTERNAL MEDICINE

## 2021-06-11 PROCEDURE — 3288F FALL RISK ASSESSMENT DOCD: CPT | Mod: CPTII,S$GLB,, | Performed by: INTERNAL MEDICINE

## 2021-06-11 PROCEDURE — 3288F PR FALLS RISK ASSESSMENT DOCUMENTED: ICD-10-PCS | Mod: CPTII,S$GLB,, | Performed by: INTERNAL MEDICINE

## 2021-06-11 PROCEDURE — 1159F PR MEDICATION LIST DOCUMENTED IN MEDICAL RECORD: ICD-10-PCS | Mod: S$GLB,,, | Performed by: INTERNAL MEDICINE

## 2021-06-11 PROCEDURE — 3008F BODY MASS INDEX DOCD: CPT | Mod: CPTII,S$GLB,, | Performed by: INTERNAL MEDICINE

## 2021-06-11 PROCEDURE — 1101F PT FALLS ASSESS-DOCD LE1/YR: CPT | Mod: CPTII,S$GLB,, | Performed by: INTERNAL MEDICINE

## 2021-06-11 RX ORDER — SUCRALFATE 1 G/1
1 TABLET ORAL
COMMUNITY
Start: 2020-12-04 | End: 2021-07-06 | Stop reason: SDUPTHER

## 2021-06-11 RX ORDER — BACLOFEN 10 MG/1
TABLET ORAL
COMMUNITY
Start: 2020-12-04

## 2021-06-11 RX ORDER — ESCITALOPRAM OXALATE 10 MG/1
TABLET ORAL
COMMUNITY
Start: 2021-06-03 | End: 2021-08-17

## 2021-06-11 RX ORDER — DEXTROMETHORPHAN HYDROBROMIDE, GUAIFENESIN 5; 100 MG/5ML; MG/5ML
1300 LIQUID ORAL
COMMUNITY
Start: 2020-12-04

## 2021-06-11 RX ORDER — PSEUDOEPHEDRINE HCL 120 MG/1
TABLET, FILM COATED, EXTENDED RELEASE ORAL
COMMUNITY
Start: 2020-12-04

## 2021-06-11 RX ORDER — ALPRAZOLAM 0.25 MG/1
TABLET ORAL
COMMUNITY
Start: 2020-12-04 | End: 2021-06-11

## 2021-06-16 PROBLEM — R10.9 ABDOMINAL PAIN: Status: RESOLVED | Noted: 2020-08-10 | Resolved: 2021-06-16

## 2021-07-01 DIAGNOSIS — F41.9 ANXIETY: Primary | ICD-10-CM

## 2021-07-02 DIAGNOSIS — D35.2 PROLACTINOMA: ICD-10-CM

## 2021-07-02 DIAGNOSIS — E22.0 ACROMEGALY: ICD-10-CM

## 2021-07-02 RX ORDER — CABERGOLINE 0.5 MG/1
TABLET ORAL
Qty: 16 TABLET | Refills: 6 | Status: SHIPPED | OUTPATIENT
Start: 2021-07-02 | End: 2021-07-06 | Stop reason: SDUPTHER

## 2021-07-02 RX ORDER — ALPRAZOLAM 0.25 MG/1
0.25 TABLET ORAL EVERY 6 HOURS PRN
Qty: 100 TABLET | Refills: 0 | Status: SHIPPED | OUTPATIENT
Start: 2021-07-02 | End: 2021-08-01

## 2021-07-06 ENCOUNTER — PATIENT MESSAGE (OUTPATIENT)
Dept: GASTROENTEROLOGY | Facility: CLINIC | Age: 66
End: 2021-07-06

## 2021-07-06 DIAGNOSIS — E22.0 ACROMEGALY: ICD-10-CM

## 2021-07-06 DIAGNOSIS — D35.2 PROLACTINOMA: ICD-10-CM

## 2021-07-06 RX ORDER — SUCRALFATE 1 G/1
1 TABLET ORAL
Qty: 360 TABLET | Refills: 1 | Status: SHIPPED | OUTPATIENT
Start: 2021-07-06 | End: 2024-01-24 | Stop reason: SDUPTHER

## 2021-07-07 ENCOUNTER — PATIENT MESSAGE (OUTPATIENT)
Dept: ENDOCRINOLOGY | Facility: CLINIC | Age: 66
End: 2021-07-07

## 2021-07-07 RX ORDER — CABERGOLINE 0.5 MG/1
TABLET ORAL
Qty: 16 TABLET | Refills: 1 | Status: SHIPPED | OUTPATIENT
Start: 2021-07-07 | End: 2022-02-14

## 2021-07-27 ENCOUNTER — PATIENT MESSAGE (OUTPATIENT)
Dept: GASTROENTEROLOGY | Facility: CLINIC | Age: 66
End: 2021-07-27

## 2021-08-17 ENCOUNTER — PATIENT MESSAGE (OUTPATIENT)
Dept: ENDOCRINOLOGY | Facility: CLINIC | Age: 66
End: 2021-08-17

## 2021-08-17 ENCOUNTER — OFFICE VISIT (OUTPATIENT)
Dept: ENDOCRINOLOGY | Facility: CLINIC | Age: 66
End: 2021-08-17
Payer: MEDICARE

## 2021-08-17 DIAGNOSIS — E22.0 ACROMEGALY: ICD-10-CM

## 2021-08-17 DIAGNOSIS — E03.8 SECONDARY HYPOTHYROIDISM: ICD-10-CM

## 2021-08-17 DIAGNOSIS — R73.02 IGT (IMPAIRED GLUCOSE TOLERANCE): Primary | ICD-10-CM

## 2021-08-17 DIAGNOSIS — E27.49 SECONDARY ADRENAL INSUFFICIENCY: ICD-10-CM

## 2021-08-17 PROCEDURE — 1160F PR REVIEW ALL MEDS BY PRESCRIBER/CLIN PHARMACIST DOCUMENTED: ICD-10-PCS | Mod: CPTII,95,, | Performed by: INTERNAL MEDICINE

## 2021-08-17 PROCEDURE — 99215 PR OFFICE/OUTPT VISIT, EST, LEVL V, 40-54 MIN: ICD-10-PCS | Mod: 95,,, | Performed by: INTERNAL MEDICINE

## 2021-08-17 PROCEDURE — 1159F MED LIST DOCD IN RCRD: CPT | Mod: CPTII,95,, | Performed by: INTERNAL MEDICINE

## 2021-08-17 PROCEDURE — 1160F RVW MEDS BY RX/DR IN RCRD: CPT | Mod: CPTII,95,, | Performed by: INTERNAL MEDICINE

## 2021-08-17 PROCEDURE — 99215 OFFICE O/P EST HI 40 MIN: CPT | Mod: 95,,, | Performed by: INTERNAL MEDICINE

## 2021-08-17 PROCEDURE — 1159F PR MEDICATION LIST DOCUMENTED IN MEDICAL RECORD: ICD-10-PCS | Mod: CPTII,95,, | Performed by: INTERNAL MEDICINE

## 2021-08-18 ENCOUNTER — TELEPHONE (OUTPATIENT)
Dept: ENDOCRINOLOGY | Facility: CLINIC | Age: 66
End: 2021-08-18

## 2021-08-18 DIAGNOSIS — E22.0 ACROMEGALY: ICD-10-CM

## 2021-08-18 DIAGNOSIS — D49.7 PITUITARY TUMOR: ICD-10-CM

## 2021-08-18 DIAGNOSIS — E03.8 SECONDARY HYPOTHYROIDISM: ICD-10-CM

## 2021-08-18 DIAGNOSIS — D35.2 PROLACTINOMA: Primary | ICD-10-CM

## 2021-08-18 DIAGNOSIS — E27.49 SECONDARY ADRENAL INSUFFICIENCY: ICD-10-CM

## 2021-08-18 DIAGNOSIS — R73.01 IMPAIRED FASTING GLUCOSE: ICD-10-CM

## 2021-08-24 ENCOUNTER — HOSPITAL ENCOUNTER (OUTPATIENT)
Dept: RADIOLOGY | Facility: HOSPITAL | Age: 66
Discharge: HOME | End: 2021-08-24
Attending: INTERNAL MEDICINE
Payer: MEDICARE

## 2021-08-24 DIAGNOSIS — E22.0 ACROMEGALY: ICD-10-CM

## 2021-08-24 PROCEDURE — 76536 US EXAM OF HEAD AND NECK: CPT | Mod: 26,,, | Performed by: RADIOLOGY

## 2021-08-24 PROCEDURE — 76536 US EXAM OF HEAD AND NECK: CPT | Mod: TC

## 2021-08-24 PROCEDURE — 76536 US SOFT TISSUE HEAD NECK THYROID: ICD-10-PCS | Mod: 26,,, | Performed by: RADIOLOGY

## 2021-09-15 ENCOUNTER — OFFICE VISIT (OUTPATIENT)
Dept: GASTROENTEROLOGY | Facility: CLINIC | Age: 66
End: 2021-09-15
Payer: MEDICARE

## 2021-09-15 VITALS
WEIGHT: 164 LBS | BODY MASS INDEX: 26.36 KG/M2 | RESPIRATION RATE: 14 BRPM | DIASTOLIC BLOOD PRESSURE: 75 MMHG | OXYGEN SATURATION: 96 % | SYSTOLIC BLOOD PRESSURE: 134 MMHG | HEIGHT: 66 IN | HEART RATE: 77 BPM

## 2021-09-15 DIAGNOSIS — K21.9 GASTROESOPHAGEAL REFLUX DISEASE, UNSPECIFIED WHETHER ESOPHAGITIS PRESENT: ICD-10-CM

## 2021-09-15 DIAGNOSIS — Z12.11 SCREENING FOR COLON CANCER: Primary | ICD-10-CM

## 2021-09-15 DIAGNOSIS — K57.90 DIVERTICULOSIS: ICD-10-CM

## 2021-09-15 DIAGNOSIS — Z12.11 SCREENING FOR COLON CANCER: ICD-10-CM

## 2021-09-15 DIAGNOSIS — Z01.818 PREOP TESTING: Primary | ICD-10-CM

## 2021-09-15 DIAGNOSIS — Z90.49 HISTORY OF CHOLECYSTECTOMY: ICD-10-CM

## 2021-09-15 DIAGNOSIS — Z12.11 SCREEN FOR COLON CANCER: Primary | ICD-10-CM

## 2021-09-15 PROCEDURE — 1101F PR PT FALLS ASSESS DOC 0-1 FALLS W/OUT INJ PAST YR: ICD-10-PCS | Mod: CPTII,S$GLB,, | Performed by: INTERNAL MEDICINE

## 2021-09-15 PROCEDURE — 99999 PR PBB SHADOW E&M-EST. PATIENT-LVL V: ICD-10-PCS | Mod: PBBFAC,,, | Performed by: INTERNAL MEDICINE

## 2021-09-15 PROCEDURE — 3288F FALL RISK ASSESSMENT DOCD: CPT | Mod: CPTII,S$GLB,, | Performed by: INTERNAL MEDICINE

## 2021-09-15 PROCEDURE — 1101F PT FALLS ASSESS-DOCD LE1/YR: CPT | Mod: CPTII,S$GLB,, | Performed by: INTERNAL MEDICINE

## 2021-09-15 PROCEDURE — 3008F BODY MASS INDEX DOCD: CPT | Mod: CPTII,S$GLB,, | Performed by: INTERNAL MEDICINE

## 2021-09-15 PROCEDURE — 1159F MED LIST DOCD IN RCRD: CPT | Mod: CPTII,S$GLB,, | Performed by: INTERNAL MEDICINE

## 2021-09-15 PROCEDURE — 99214 PR OFFICE/OUTPT VISIT, EST, LEVL IV, 30-39 MIN: ICD-10-PCS | Mod: S$GLB,,, | Performed by: INTERNAL MEDICINE

## 2021-09-15 PROCEDURE — 3078F PR MOST RECENT DIASTOLIC BLOOD PRESSURE < 80 MM HG: ICD-10-PCS | Mod: CPTII,S$GLB,, | Performed by: INTERNAL MEDICINE

## 2021-09-15 PROCEDURE — 1126F PR PAIN SEVERITY QUANTIFIED, NO PAIN PRESENT: ICD-10-PCS | Mod: CPTII,S$GLB,, | Performed by: INTERNAL MEDICINE

## 2021-09-15 PROCEDURE — 1160F RVW MEDS BY RX/DR IN RCRD: CPT | Mod: CPTII,S$GLB,, | Performed by: INTERNAL MEDICINE

## 2021-09-15 PROCEDURE — 1126F AMNT PAIN NOTED NONE PRSNT: CPT | Mod: CPTII,S$GLB,, | Performed by: INTERNAL MEDICINE

## 2021-09-15 PROCEDURE — 3078F DIAST BP <80 MM HG: CPT | Mod: CPTII,S$GLB,, | Performed by: INTERNAL MEDICINE

## 2021-09-15 PROCEDURE — 3075F SYST BP GE 130 - 139MM HG: CPT | Mod: CPTII,S$GLB,, | Performed by: INTERNAL MEDICINE

## 2021-09-15 PROCEDURE — 1160F PR REVIEW ALL MEDS BY PRESCRIBER/CLIN PHARMACIST DOCUMENTED: ICD-10-PCS | Mod: CPTII,S$GLB,, | Performed by: INTERNAL MEDICINE

## 2021-09-15 PROCEDURE — 99214 OFFICE O/P EST MOD 30 MIN: CPT | Mod: S$GLB,,, | Performed by: INTERNAL MEDICINE

## 2021-09-15 PROCEDURE — 1159F PR MEDICATION LIST DOCUMENTED IN MEDICAL RECORD: ICD-10-PCS | Mod: CPTII,S$GLB,, | Performed by: INTERNAL MEDICINE

## 2021-09-15 PROCEDURE — 99999 PR PBB SHADOW E&M-EST. PATIENT-LVL V: CPT | Mod: PBBFAC,,, | Performed by: INTERNAL MEDICINE

## 2021-09-15 PROCEDURE — 3044F HG A1C LEVEL LT 7.0%: CPT | Mod: CPTII,S$GLB,, | Performed by: INTERNAL MEDICINE

## 2021-09-15 PROCEDURE — 3288F PR FALLS RISK ASSESSMENT DOCUMENTED: ICD-10-PCS | Mod: CPTII,S$GLB,, | Performed by: INTERNAL MEDICINE

## 2021-09-15 PROCEDURE — 3044F PR MOST RECENT HEMOGLOBIN A1C LEVEL <7.0%: ICD-10-PCS | Mod: CPTII,S$GLB,, | Performed by: INTERNAL MEDICINE

## 2021-09-15 PROCEDURE — 3008F PR BODY MASS INDEX (BMI) DOCUMENTED: ICD-10-PCS | Mod: CPTII,S$GLB,, | Performed by: INTERNAL MEDICINE

## 2021-09-15 PROCEDURE — 3075F PR MOST RECENT SYSTOLIC BLOOD PRESS GE 130-139MM HG: ICD-10-PCS | Mod: CPTII,S$GLB,, | Performed by: INTERNAL MEDICINE

## 2021-09-15 RX ORDER — SODIUM CHLORIDE 0.9 % (FLUSH) 0.9 %
10 SYRINGE (ML) INJECTION
Status: CANCELLED | OUTPATIENT
Start: 2021-09-15

## 2021-09-16 ENCOUNTER — PATIENT MESSAGE (OUTPATIENT)
Dept: SURGERY | Facility: HOSPITAL | Age: 66
End: 2021-09-16

## 2021-09-16 PROBLEM — Z12.11 SCREENING FOR COLON CANCER: Status: ACTIVE | Noted: 2021-09-16

## 2021-09-16 RX ORDER — SODIUM, POTASSIUM,MAG SULFATES 17.5-3.13G
1 SOLUTION, RECONSTITUTED, ORAL ORAL DAILY
Qty: 1 KIT | Refills: 0 | Status: SHIPPED | OUTPATIENT
Start: 2021-09-16 | End: 2021-09-18

## 2021-09-26 ENCOUNTER — PATIENT MESSAGE (OUTPATIENT)
Dept: ENDOCRINOLOGY | Facility: CLINIC | Age: 66
End: 2021-09-26

## 2021-09-26 DIAGNOSIS — E03.8 SECONDARY HYPOTHYROIDISM: ICD-10-CM

## 2021-09-27 RX ORDER — LEVOTHYROXINE SODIUM 75 UG/1
75 TABLET ORAL
Qty: 90 TABLET | Refills: 3 | Status: SHIPPED | OUTPATIENT
Start: 2021-09-27 | End: 2022-08-26

## 2021-09-28 ENCOUNTER — PATIENT MESSAGE (OUTPATIENT)
Dept: ENDOCRINOLOGY | Facility: CLINIC | Age: 66
End: 2021-09-28

## 2021-09-28 DIAGNOSIS — M85.80 OSTEOPENIA, UNSPECIFIED LOCATION: ICD-10-CM

## 2021-09-28 DIAGNOSIS — E27.49 SECONDARY ADRENAL INSUFFICIENCY: Primary | ICD-10-CM

## 2021-09-28 DIAGNOSIS — E03.8 SECONDARY HYPOTHYROIDISM: ICD-10-CM

## 2021-09-28 DIAGNOSIS — E22.0 ACROMEGALY: ICD-10-CM

## 2021-09-28 DIAGNOSIS — D49.7 PITUITARY TUMOR: ICD-10-CM

## 2021-09-28 RX ORDER — HYDROCORTISONE 10 MG/1
TABLET ORAL
Qty: 180 TABLET | Refills: 3 | Status: SHIPPED | OUTPATIENT
Start: 2021-09-28 | End: 2022-02-14

## 2021-10-04 ENCOUNTER — PATIENT MESSAGE (OUTPATIENT)
Dept: SURGERY | Facility: HOSPITAL | Age: 66
End: 2021-10-04

## 2021-10-07 DIAGNOSIS — E27.49 SECONDARY ADRENAL INSUFFICIENCY: ICD-10-CM

## 2021-10-10 ENCOUNTER — LAB VISIT (OUTPATIENT)
Dept: FAMILY MEDICINE | Facility: CLINIC | Age: 66
End: 2021-10-10
Payer: MEDICARE

## 2021-10-10 ENCOUNTER — HOSPITAL ENCOUNTER (OUTPATIENT)
Dept: CARDIOLOGY | Facility: HOSPITAL | Age: 66
Discharge: HOME | End: 2021-10-10
Attending: INTERNAL MEDICINE
Payer: MEDICARE

## 2021-10-10 DIAGNOSIS — Z01.818 PREOP TESTING: ICD-10-CM

## 2021-10-10 PROCEDURE — 93010 EKG 12-LEAD: ICD-10-PCS | Mod: ,,, | Performed by: INTERNAL MEDICINE

## 2021-10-10 PROCEDURE — 93005 ELECTROCARDIOGRAM TRACING: CPT

## 2021-10-10 PROCEDURE — U0005 INFEC AGEN DETEC AMPLI PROBE: HCPCS | Performed by: INTERNAL MEDICINE

## 2021-10-10 PROCEDURE — U0003 INFECTIOUS AGENT DETECTION BY NUCLEIC ACID (DNA OR RNA); SEVERE ACUTE RESPIRATORY SYNDROME CORONAVIRUS 2 (SARS-COV-2) (CORONAVIRUS DISEASE [COVID-19]), AMPLIFIED PROBE TECHNIQUE, MAKING USE OF HIGH THROUGHPUT TECHNOLOGIES AS DESCRIBED BY CMS-2020-01-R: HCPCS | Performed by: INTERNAL MEDICINE

## 2021-10-10 PROCEDURE — 93010 ELECTROCARDIOGRAM REPORT: CPT | Mod: ,,, | Performed by: INTERNAL MEDICINE

## 2021-10-11 ENCOUNTER — PATIENT MESSAGE (OUTPATIENT)
Dept: ENDOCRINOLOGY | Facility: CLINIC | Age: 66
End: 2021-10-11

## 2021-10-11 ENCOUNTER — TELEPHONE (OUTPATIENT)
Dept: ENDOCRINOLOGY | Facility: CLINIC | Age: 66
End: 2021-10-11

## 2021-10-11 DIAGNOSIS — E27.49 SECONDARY ADRENAL INSUFFICIENCY: Primary | ICD-10-CM

## 2021-10-11 DIAGNOSIS — E27.49 SECONDARY ADRENAL INSUFFICIENCY: ICD-10-CM

## 2021-10-11 LAB
SARS-COV-2 RNA RESP QL NAA+PROBE: NOT DETECTED
SARS-COV-2- CYCLE NUMBER: NORMAL

## 2021-10-11 RX ORDER — DEXAMETHASONE SODIUM PHOSPHATE 4 MG/ML
2 INJECTION, SOLUTION INTRA-ARTICULAR; INTRALESIONAL; INTRAMUSCULAR; INTRAVENOUS; SOFT TISSUE DAILY PRN
Qty: 1 ML | Refills: 1 | Status: SHIPPED | OUTPATIENT
Start: 2021-10-11 | End: 2022-09-21

## 2021-10-11 RX ORDER — DEXAMETHASONE SODIUM PHOSPHATE 4 MG/ML
2 INJECTION, SOLUTION INTRA-ARTICULAR; INTRALESIONAL; INTRAMUSCULAR; INTRAVENOUS; SOFT TISSUE DAILY PRN
Qty: 1 ML | Refills: 1 | Status: SHIPPED | OUTPATIENT
Start: 2021-10-11 | End: 2021-10-11 | Stop reason: SDUPTHER

## 2021-10-11 RX ORDER — DEXAMETHASONE SODIUM PHOSPHATE 4 MG/ML
4 INJECTION, SOLUTION INTRA-ARTICULAR; INTRALESIONAL; INTRAMUSCULAR; INTRAVENOUS; SOFT TISSUE DAILY PRN
Qty: 1 ML | Refills: 1 | Status: SHIPPED | OUTPATIENT
Start: 2021-10-11 | End: 2021-10-11

## 2021-10-12 ENCOUNTER — HOSPITAL ENCOUNTER (OUTPATIENT)
Facility: HOSPITAL | Age: 66
Discharge: HOME | End: 2021-10-12
Attending: INTERNAL MEDICINE | Admitting: INTERNAL MEDICINE
Payer: MEDICARE

## 2021-10-12 ENCOUNTER — ANESTHESIA (OUTPATIENT)
Dept: SURGERY | Facility: HOSPITAL | Age: 66
End: 2021-10-12
Payer: MEDICARE

## 2021-10-12 ENCOUNTER — ANESTHESIA EVENT (OUTPATIENT)
Dept: SURGERY | Facility: HOSPITAL | Age: 66
End: 2021-10-12
Payer: MEDICARE

## 2021-10-12 VITALS
TEMPERATURE: 97 F | DIASTOLIC BLOOD PRESSURE: 70 MMHG | HEIGHT: 66 IN | HEART RATE: 70 BPM | OXYGEN SATURATION: 97 % | SYSTOLIC BLOOD PRESSURE: 112 MMHG | BODY MASS INDEX: 25.71 KG/M2 | WEIGHT: 160 LBS | RESPIRATION RATE: 12 BRPM

## 2021-10-12 DIAGNOSIS — Z12.11 SCREENING FOR COLON CANCER: ICD-10-CM

## 2021-10-12 DIAGNOSIS — Z12.11 SCREEN FOR COLON CANCER: ICD-10-CM

## 2021-10-12 PROCEDURE — D9220A PRA ANESTHESIA: Mod: PT,ANES,, | Performed by: ANESTHESIOLOGY

## 2021-10-12 PROCEDURE — D9220A PRA ANESTHESIA: ICD-10-PCS | Mod: PT,ANES,, | Performed by: ANESTHESIOLOGY

## 2021-10-12 PROCEDURE — 88305 TISSUE EXAM BY PATHOLOGIST: CPT | Performed by: PATHOLOGY

## 2021-10-12 PROCEDURE — 37000009 HC ANESTHESIA EA ADD 15 MINS: Performed by: INTERNAL MEDICINE

## 2021-10-12 PROCEDURE — 37000008 HC ANESTHESIA 1ST 15 MINUTES: Performed by: INTERNAL MEDICINE

## 2021-10-12 PROCEDURE — 45380 PR COLONOSCOPY,BIOPSY: ICD-10-PCS | Mod: PT,,, | Performed by: INTERNAL MEDICINE

## 2021-10-12 PROCEDURE — 25000003 PHARM REV CODE 250: Performed by: NURSE ANESTHETIST, CERTIFIED REGISTERED

## 2021-10-12 PROCEDURE — 88305 TISSUE EXAM BY PATHOLOGIST: CPT | Mod: 26,,, | Performed by: PATHOLOGY

## 2021-10-12 PROCEDURE — 88305 TISSUE EXAM BY PATHOLOGIST: ICD-10-PCS | Mod: 26,,, | Performed by: PATHOLOGY

## 2021-10-12 PROCEDURE — D9220A PRA ANESTHESIA: Mod: PT,CRNA,, | Performed by: NURSE ANESTHETIST, CERTIFIED REGISTERED

## 2021-10-12 PROCEDURE — 63600175 PHARM REV CODE 636 W HCPCS: Performed by: NURSE ANESTHETIST, CERTIFIED REGISTERED

## 2021-10-12 PROCEDURE — 45380 COLONOSCOPY AND BIOPSY: CPT | Mod: PT | Performed by: INTERNAL MEDICINE

## 2021-10-12 PROCEDURE — 27201423 OPTIME MED/SURG SUP & DEVICES STERILE SUPPLY: Performed by: INTERNAL MEDICINE

## 2021-10-12 PROCEDURE — D9220A PRA ANESTHESIA: ICD-10-PCS | Mod: PT,CRNA,, | Performed by: NURSE ANESTHETIST, CERTIFIED REGISTERED

## 2021-10-12 PROCEDURE — 45380 COLONOSCOPY AND BIOPSY: CPT | Mod: PT,,, | Performed by: INTERNAL MEDICINE

## 2021-10-12 PROCEDURE — 63600175 PHARM REV CODE 636 W HCPCS: Performed by: INTERNAL MEDICINE

## 2021-10-12 RX ORDER — SODIUM CHLORIDE 0.9 % (FLUSH) 0.9 %
10 SYRINGE (ML) INJECTION
Status: DISCONTINUED | OUTPATIENT
Start: 2021-10-12 | End: 2021-10-12 | Stop reason: HOSPADM

## 2021-10-12 RX ORDER — PROPOFOL 10 MG/ML
VIAL (ML) INTRAVENOUS
Status: DISCONTINUED | OUTPATIENT
Start: 2021-10-12 | End: 2021-10-12

## 2021-10-12 RX ORDER — ONDANSETRON 2 MG/ML
4 INJECTION INTRAMUSCULAR; INTRAVENOUS DAILY PRN
Status: DISCONTINUED | OUTPATIENT
Start: 2021-10-12 | End: 2021-10-12 | Stop reason: HOSPADM

## 2021-10-12 RX ORDER — LIDOCAINE HYDROCHLORIDE 10 MG/ML
1 INJECTION, SOLUTION EPIDURAL; INFILTRATION; INTRACAUDAL; PERINEURAL ONCE
Status: DISCONTINUED | OUTPATIENT
Start: 2021-10-12 | End: 2021-10-12 | Stop reason: HOSPADM

## 2021-10-12 RX ORDER — SODIUM CHLORIDE, SODIUM LACTATE, POTASSIUM CHLORIDE, CALCIUM CHLORIDE 600; 310; 30; 20 MG/100ML; MG/100ML; MG/100ML; MG/100ML
INJECTION, SOLUTION INTRAVENOUS CONTINUOUS
Status: DISCONTINUED | OUTPATIENT
Start: 2021-10-12 | End: 2021-10-12 | Stop reason: HOSPADM

## 2021-10-12 RX ORDER — DEXAMETHASONE SODIUM PHOSPHATE 4 MG/ML
INJECTION, SOLUTION INTRA-ARTICULAR; INTRALESIONAL; INTRAMUSCULAR; INTRAVENOUS; SOFT TISSUE
Status: DISCONTINUED | OUTPATIENT
Start: 2021-10-12 | End: 2021-10-12

## 2021-10-12 RX ORDER — SODIUM CHLORIDE, SODIUM LACTATE, POTASSIUM CHLORIDE, CALCIUM CHLORIDE 600; 310; 30; 20 MG/100ML; MG/100ML; MG/100ML; MG/100ML
125 INJECTION, SOLUTION INTRAVENOUS CONTINUOUS
Status: DISCONTINUED | OUTPATIENT
Start: 2021-10-12 | End: 2021-10-12 | Stop reason: HOSPADM

## 2021-10-12 RX ORDER — LIDOCAINE HYDROCHLORIDE 20 MG/ML
INJECTION, SOLUTION EPIDURAL; INFILTRATION; INTRACAUDAL; PERINEURAL
Status: DISCONTINUED | OUTPATIENT
Start: 2021-10-12 | End: 2021-10-12

## 2021-10-12 RX ADMIN — PROPOFOL 50 MG: 10 INJECTION, EMULSION INTRAVENOUS at 11:10

## 2021-10-12 RX ADMIN — DEXAMETHASONE SODIUM PHOSPHATE 4 MG: 4 INJECTION, SOLUTION INTRAMUSCULAR; INTRAVENOUS at 11:10

## 2021-10-12 RX ADMIN — LIDOCAINE HYDROCHLORIDE 100 MG: 20 INJECTION, SOLUTION EPIDURAL; INFILTRATION; INTRACAUDAL; PERINEURAL at 11:10

## 2021-10-12 RX ADMIN — PROPOFOL 100 MG: 10 INJECTION, EMULSION INTRAVENOUS at 11:10

## 2021-10-12 RX ADMIN — SODIUM CHLORIDE, SODIUM LACTATE, POTASSIUM CHLORIDE, AND CALCIUM CHLORIDE: .6; .31; .03; .02 INJECTION, SOLUTION INTRAVENOUS at 09:10

## 2021-10-19 LAB
FINAL PATHOLOGIC DIAGNOSIS: NORMAL
GROSS: NORMAL
Lab: NORMAL

## 2021-11-12 ENCOUNTER — PATIENT OUTREACH (OUTPATIENT)
Dept: ADMINISTRATIVE | Facility: HOSPITAL | Age: 66
End: 2021-11-12
Payer: MEDICARE

## 2021-12-15 ENCOUNTER — OFFICE VISIT (OUTPATIENT)
Dept: GASTROENTEROLOGY | Facility: CLINIC | Age: 66
End: 2021-12-15
Payer: MEDICARE

## 2021-12-15 VITALS
WEIGHT: 165.25 LBS | BODY MASS INDEX: 26.56 KG/M2 | HEIGHT: 66 IN | HEART RATE: 76 BPM | OXYGEN SATURATION: 97 % | DIASTOLIC BLOOD PRESSURE: 82 MMHG | SYSTOLIC BLOOD PRESSURE: 135 MMHG | RESPIRATION RATE: 15 BRPM

## 2021-12-15 DIAGNOSIS — Z90.49 HISTORY OF CHOLECYSTECTOMY: Primary | ICD-10-CM

## 2021-12-15 DIAGNOSIS — K21.9 GASTROESOPHAGEAL REFLUX DISEASE, UNSPECIFIED WHETHER ESOPHAGITIS PRESENT: ICD-10-CM

## 2021-12-15 DIAGNOSIS — K57.90 DIVERTICULOSIS: ICD-10-CM

## 2021-12-15 DIAGNOSIS — K64.9 HEMORRHOIDS, UNSPECIFIED HEMORRHOID TYPE: ICD-10-CM

## 2021-12-15 PROBLEM — Z12.11 SCREENING FOR COLON CANCER: Status: RESOLVED | Noted: 2021-09-16 | Resolved: 2021-12-15

## 2021-12-15 PROCEDURE — 99214 OFFICE O/P EST MOD 30 MIN: CPT | Mod: S$GLB,,, | Performed by: INTERNAL MEDICINE

## 2021-12-15 PROCEDURE — 99214 PR OFFICE/OUTPT VISIT, EST, LEVL IV, 30-39 MIN: ICD-10-PCS | Mod: S$GLB,,, | Performed by: INTERNAL MEDICINE

## 2021-12-15 PROCEDURE — 99999 PR PBB SHADOW E&M-EST. PATIENT-LVL IV: ICD-10-PCS | Mod: PBBFAC,,, | Performed by: INTERNAL MEDICINE

## 2021-12-15 PROCEDURE — 99999 PR PBB SHADOW E&M-EST. PATIENT-LVL IV: CPT | Mod: PBBFAC,,, | Performed by: INTERNAL MEDICINE

## 2022-09-06 ENCOUNTER — PATIENT MESSAGE (OUTPATIENT)
Dept: GASTROENTEROLOGY | Facility: CLINIC | Age: 67
End: 2022-09-06
Payer: MEDICARE

## 2022-09-09 ENCOUNTER — DOCUMENTATION ONLY (OUTPATIENT)
Dept: GASTROENTEROLOGY | Facility: CLINIC | Age: 67
End: 2022-09-09
Payer: MEDICARE

## 2022-09-09 ENCOUNTER — TELEPHONE (OUTPATIENT)
Dept: GASTROENTEROLOGY | Facility: CLINIC | Age: 67
End: 2022-09-09
Payer: MEDICARE

## 2022-09-09 NOTE — TELEPHONE ENCOUNTER
----- Message from Alondra Guevara sent at 9/9/2022  3:22 PM CDT -----  Contact: Tatum  Type:  Pharmacy Calling to Clarify an RX    Name of Caller: Tatum     [facility]: [facility]     Prescription Name: ALPRAZolam (XANAX) 0.25 MG tablet      What do they need to clarify?: Pharmacy needs the last appointment date and diagnosis code on prescription.   -  Best Call Back Number: [phone]    Additional Information:

## 2022-09-09 NOTE — TELEPHONE ENCOUNTER
Called pharmacy to cancel prescription. Patient informed that she needs an appointment before having medication filled due to it being 12/2021 since last office visit.

## 2022-09-09 NOTE — PROGRESS NOTES
Called in Xanax 0.25mg. 1 tablet by mouth every 6 hours as needed for anxiety. Dispense 100 to [facility] pharmacy.

## 2022-09-09 NOTE — TELEPHONE ENCOUNTER
----- Message from Alondra Guevara sent at 9/9/2022  3:22 PM CDT -----  Contact: Tatum  Type:  Pharmacy Calling to Clarify an RX    Name of Caller: Tatum     [facility]     Prescription Name: ALPRAZolam (XANAX) 0.25 MG tablet      What do they need to clarify?: Pharmacy needs the last appointment date and diagnosis code on prescription.   -  Best Call Back Number: [phone]    Additional Information:

## 2022-09-21 ENCOUNTER — OFFICE VISIT (OUTPATIENT)
Dept: GASTROENTEROLOGY | Facility: CLINIC | Age: 67
End: 2022-09-21
Payer: MEDICARE

## 2022-09-21 VITALS
SYSTOLIC BLOOD PRESSURE: 132 MMHG | OXYGEN SATURATION: 96 % | WEIGHT: 162.19 LBS | HEIGHT: 66 IN | DIASTOLIC BLOOD PRESSURE: 80 MMHG | HEART RATE: 59 BPM | BODY MASS INDEX: 26.07 KG/M2

## 2022-09-21 DIAGNOSIS — K21.9 GASTROESOPHAGEAL REFLUX DISEASE, UNSPECIFIED WHETHER ESOPHAGITIS PRESENT: ICD-10-CM

## 2022-09-21 DIAGNOSIS — K64.9 HEMORRHOIDS, UNSPECIFIED HEMORRHOID TYPE: ICD-10-CM

## 2022-09-21 DIAGNOSIS — F41.9 ANXIETY: ICD-10-CM

## 2022-09-21 DIAGNOSIS — Z90.49 HISTORY OF CHOLECYSTECTOMY: Primary | ICD-10-CM

## 2022-09-21 DIAGNOSIS — K57.90 DIVERTICULOSIS: ICD-10-CM

## 2022-09-21 PROCEDURE — 1159F MED LIST DOCD IN RCRD: CPT | Mod: CPTII,S$GLB,, | Performed by: INTERNAL MEDICINE

## 2022-09-21 PROCEDURE — 1101F PR PT FALLS ASSESS DOC 0-1 FALLS W/OUT INJ PAST YR: ICD-10-PCS | Mod: CPTII,S$GLB,, | Performed by: INTERNAL MEDICINE

## 2022-09-21 PROCEDURE — 1101F PT FALLS ASSESS-DOCD LE1/YR: CPT | Mod: CPTII,S$GLB,, | Performed by: INTERNAL MEDICINE

## 2022-09-21 PROCEDURE — 1126F PR PAIN SEVERITY QUANTIFIED, NO PAIN PRESENT: ICD-10-PCS | Mod: CPTII,S$GLB,, | Performed by: INTERNAL MEDICINE

## 2022-09-21 PROCEDURE — 99214 PR OFFICE/OUTPT VISIT, EST, LEVL IV, 30-39 MIN: ICD-10-PCS | Mod: S$GLB,,, | Performed by: INTERNAL MEDICINE

## 2022-09-21 PROCEDURE — 3008F PR BODY MASS INDEX (BMI) DOCUMENTED: ICD-10-PCS | Mod: CPTII,S$GLB,, | Performed by: INTERNAL MEDICINE

## 2022-09-21 PROCEDURE — 99999 PR PBB SHADOW E&M-EST. PATIENT-LVL IV: ICD-10-PCS | Mod: PBBFAC,,, | Performed by: INTERNAL MEDICINE

## 2022-09-21 PROCEDURE — 1159F PR MEDICATION LIST DOCUMENTED IN MEDICAL RECORD: ICD-10-PCS | Mod: CPTII,S$GLB,, | Performed by: INTERNAL MEDICINE

## 2022-09-21 PROCEDURE — 3288F FALL RISK ASSESSMENT DOCD: CPT | Mod: CPTII,S$GLB,, | Performed by: INTERNAL MEDICINE

## 2022-09-21 PROCEDURE — 3008F BODY MASS INDEX DOCD: CPT | Mod: CPTII,S$GLB,, | Performed by: INTERNAL MEDICINE

## 2022-09-21 PROCEDURE — 1160F PR REVIEW ALL MEDS BY PRESCRIBER/CLIN PHARMACIST DOCUMENTED: ICD-10-PCS | Mod: CPTII,S$GLB,, | Performed by: INTERNAL MEDICINE

## 2022-09-21 PROCEDURE — 3079F DIAST BP 80-89 MM HG: CPT | Mod: CPTII,S$GLB,, | Performed by: INTERNAL MEDICINE

## 2022-09-21 PROCEDURE — 1126F AMNT PAIN NOTED NONE PRSNT: CPT | Mod: CPTII,S$GLB,, | Performed by: INTERNAL MEDICINE

## 2022-09-21 PROCEDURE — 3079F PR MOST RECENT DIASTOLIC BLOOD PRESSURE 80-89 MM HG: ICD-10-PCS | Mod: CPTII,S$GLB,, | Performed by: INTERNAL MEDICINE

## 2022-09-21 PROCEDURE — 3075F SYST BP GE 130 - 139MM HG: CPT | Mod: CPTII,S$GLB,, | Performed by: INTERNAL MEDICINE

## 2022-09-21 PROCEDURE — 99999 PR PBB SHADOW E&M-EST. PATIENT-LVL IV: CPT | Mod: PBBFAC,,, | Performed by: INTERNAL MEDICINE

## 2022-09-21 PROCEDURE — 3288F PR FALLS RISK ASSESSMENT DOCUMENTED: ICD-10-PCS | Mod: CPTII,S$GLB,, | Performed by: INTERNAL MEDICINE

## 2022-09-21 PROCEDURE — 3075F PR MOST RECENT SYSTOLIC BLOOD PRESS GE 130-139MM HG: ICD-10-PCS | Mod: CPTII,S$GLB,, | Performed by: INTERNAL MEDICINE

## 2022-09-21 PROCEDURE — 1160F RVW MEDS BY RX/DR IN RCRD: CPT | Mod: CPTII,S$GLB,, | Performed by: INTERNAL MEDICINE

## 2022-09-21 PROCEDURE — 99214 OFFICE O/P EST MOD 30 MIN: CPT | Mod: S$GLB,,, | Performed by: INTERNAL MEDICINE

## 2022-09-21 RX ORDER — ALPRAZOLAM 0.25 MG/1
0.25 TABLET ORAL 2 TIMES DAILY PRN
Qty: 100 TABLET | Refills: 0 | Status: SHIPPED | OUTPATIENT
Start: 2022-09-21 | End: 2024-01-24 | Stop reason: SDUPTHER

## 2022-09-21 NOTE — PROGRESS NOTES
Subjective:       Patient ID: Mary Kay Schwartz is a 67 y.o. female.    Chief Complaint: Follow-up    She takes the omeprazole which has resolved her upper GI symptoms.  She has a history of gastroesophageal reflux but she denies dysphagia aspiration hematemesis hematochezia or jaundice of bleeding.  Recently she had a colonoscopy revealing diverticulosis.  She will consider whether she wants another colonoscopy but will not be scheduled for 5 years.  She has tried to avoid the fried and the fatty foods and add  fiber and vegetables to the diet.  She denies GI symptoms.    Allergies:  Review of patient's allergies indicates:  No Known Allergies    Medications:    Current Outpatient Medications:     acetaminophen (TYLENOL) 650 MG TbSR, 1,300 mg., Disp: , Rfl:     baclofen (LIORESAL) 10 MG tablet,  0 Refill(s), Soft Stop, Disp: , Rfl:     biotin 5,000 mcg TbDL, Take by mouth., Disp: , Rfl:     cabergoline (DOSTINEX) 0.5 mg tablet, TAKE 1/2 (ONE-HALF) TABLET BY MOUTH THREE TIMES A WEEK, Disp: 16 tablet, Rfl: 3    CALCIUM CARBONATE/VITAMIN D3 (VITAMIN D-3 ORAL), Take 2,000 mg by mouth 2 (two) times daily., Disp: , Rfl:     cetirizine (ZYRTEC) 10 MG tablet, Take by mouth. 1 Tablet Oral Every day, Disp: , Rfl:     hydrocortisone (CORTEF) 10 MG Tab, TAKE 1 TABLET BY MOUTH ONCE DAILY IN THE MORNING AND 1/2 (ONE-HALF) IN THE EVENING, Disp: 270 tablet, Rfl: 3    levothyroxine (SYNTHROID) 75 MCG tablet, TAKE 1 TABLET BY MOUTH BEFORE BREAKFAST, Disp: 90 tablet, Rfl: 0    magnesium 250 mg Tab, Take by mouth. 1-2 Tablet Oral daily, Disp: , Rfl:     multivitamin capsule, Take 1 capsule by mouth once daily., Disp: , Rfl:     omeprazole (PRILOSEC) 20 MG capsule, Take 20 mg by mouth once daily., Disp: , Rfl:     pseudoephedrine (SUDAFED) 120 mg 12 hr tablet,  0 Refill(s), Disp: , Rfl:     selenium 200 mcg Tab, Take by mouth., Disp: , Rfl:     sucralfate (CARAFATE) 1 gram tablet, Take 1 tablet (1 g total) by mouth 4 (four) times daily  before meals and nightly., Disp: 360 tablet, Rfl: 1    XARELTO 10 mg Tab, , Disp: , Rfl:     ALPRAZolam (XANAX) 0.25 MG tablet, Take 1 tablet (0.25 mg total) by mouth 2 (two) times daily as needed for Anxiety., Disp: 100 tablet, Rfl: 0    Past Medical History:   Diagnosis Date    Acromegaly     Allergy     VITAMIN D DEFICIENCY    DVT (deep venous thrombosis)     Factor V Leiden mutation     GERD (gastroesophageal reflux disease)     Goiter 10/18/2016    IGT (impaired glucose tolerance)     Menopause present     Migraine headache     Osteoporosis, unspecified     Pituitary macroadenoma     s/p transphenoidal surgery on 12/8/10 by Dr. Hernandez; s/p transphenoidal surgery by Dr. Carlos on  8/23/2011; s/p radiosurgery with 14 Gy to the 50% isodose  line on 3/15/2012    Prolactinoma     Protein C deficiency     Prothrombin gene mutation     Vitamin D deficiency disease        Past Surgical History:   Procedure Laterality Date    BRAIN SURGERY      RADIOSURGERY/PITUIRAY RESECTION    CHOLECYSTECTOMY      COLONOSCOPY N/A 10/12/2021    Procedure: COLONOSCOPY;  Surgeon: Willie Bingham MD;  Location: [facility];  Service: Endoscopy;  Laterality: N/A;    difficult intubation      ESOPHAGOGASTRODUODENOSCOPY N/A 7/29/2020    Procedure: EGD (ESOPHAGOGASTRODUODENOSCOPY);  Surgeon: Willie Bingham MD;  Location: [facility];  Service: Endoscopy;  Laterality: N/A;    PITUITARY SURGERY      TOTAL HIP ARTHROPLASTY      Right Hip         Review of Systems   Constitutional:  Negative for appetite change, fever and unexpected weight change.   HENT:  Negative for trouble swallowing.         No jaundice.   Respiratory:  Negative for cough, shortness of breath and wheezing.         She is a former smoker.  Currently she denies tobacco usage or alcohol consumption.  She denies dysphagia and aspiration hemoptysis chronic cough chronic sputum production or dyspnea on exertion.   Cardiovascular:  Negative for chest pain.        She denies cardiopulmonary  symptoms such as exertional chest pain no rhythm disturbance.   Gastrointestinal:  Negative for abdominal distention, abdominal pain, anal bleeding, blood in stool, constipation, diarrhea, nausea, rectal pain and vomiting.        She has a history of diverticulosis and cholecystectomy.   Endocrine:        She is followed for acromegaly and also thyroid goiter She is followed by the endocrinologist for her pituitary every October.  She has been doing well on replacement therapy.   Musculoskeletal:  Positive for arthralgias. Negative for back pain and neck pain.   Skin:  Negative for pallor and rash.   Neurological:  Negative for dizziness, seizures, syncope, speech difficulty, weakness and numbness.   Hematological:  Negative for adenopathy.   Psychiatric/Behavioral:  Negative for confusion.         She has been under severe stress.  She has anxiety episodes that are controlled by the xanax  She received 100 xanax in the year 2000 and recently has  used them up.  She generally takes 1xanax per day and occasionally 2 times per day.  She uses her medicines correctly.  The anxiety episodes are so severe with a sensation of impending doom and tremulous.  She is having extreme difficulty with heremilysband who is an alcoholic causing quite a bit of stress and anxiety.     Objective:      Physical Exam  Vitals reviewed.   Constitutional:       Appearance: She is well-developed.      Comments: Well-nourished well-hydrated afebrile nonicteric white female.  She is sitting comfortably in the chair.  She is breathing normally.  She is normocephalic.  Pupils are normal.  She is oriented x3 and can't relate her history and answer questions appropriately.   HENT:      Head: Normocephalic.   Eyes:      Pupils: Pupils are equal, round, and reactive to light.   Neck:      Thyroid: No thyromegaly.      Trachea: No tracheal deviation.   Cardiovascular:      Rate and Rhythm: Normal rate and regular rhythm.      Heart sounds: Normal heart  sounds.   Pulmonary:      Effort: Pulmonary effort is normal.      Breath sounds: Normal breath sounds.   Abdominal:      General: There is no distension.      Palpations: There is no mass.      Tenderness: There is no abdominal tenderness. There is no guarding or rebound.      Hernia: No hernia is present.   Musculoskeletal:         General: Normal range of motion.      Cervical back: Normal range of motion and neck supple.      Comments: She can ambulate normally.  She can go from the sitting to standing position without difficulty.   Lymphadenopathy:      Cervical: No cervical adenopathy.   Skin:     General: Skin is warm and dry.   Neurological:      Mental Status: She is alert and oriented to person, place, and time.      Cranial Nerves: No cranial nerve deficit.   Psychiatric:         Behavior: Behavior normal.         Plan:       History of cholecystectomy    Anxiety  -     ALPRAZolam (XANAX) 0.25 MG tablet; Take 1 tablet (0.25 mg total) by mouth 2 (two) times daily as needed for Anxiety.  Dispense: 100 tablet; Refill: 0    Gastroesophageal reflux disease, unspecified whether esophagitis present    Diverticulosis    Hemorrhoids, unspecified hemorrhoid type          Two she will continue the reflux regimen current medications vitamins and minerals.  She continues her right nutritious diet with adequate fiber and vegetables.  She follows up with her endocrinologist and her other physicians.  She uses an [facility] as directed.  She is trying to get her  to go into therapy with her but it has been difficult.  She will follow-up with her PCP.

## 2022-09-21 NOTE — PATIENT INSTRUCTIONS
Should continue the omeprazole and Carafate and reflux regimen.  She is under stress and she uses the Xanax correctly for this stress.  She follows up with her endocrinologist for the pituitary evaluation.  She continues indication vitamins and minerals.

## 2022-11-23 ENCOUNTER — HOSPITAL ENCOUNTER (OUTPATIENT)
Dept: RADIOLOGY | Facility: HOSPITAL | Age: 67
Discharge: HOME | End: 2022-11-23
Attending: INTERNAL MEDICINE
Payer: MEDICARE

## 2022-11-23 DIAGNOSIS — E04.2 MULTINODULAR GOITER (NONTOXIC): ICD-10-CM

## 2022-11-23 DIAGNOSIS — D35.2 PROLACTINOMA: ICD-10-CM

## 2022-11-23 DIAGNOSIS — Z86.39: ICD-10-CM

## 2022-11-23 DIAGNOSIS — D49.7 PITUITARY TUMOR: Primary | ICD-10-CM

## 2022-11-23 DIAGNOSIS — D49.7 PITUITARY TUMOR: ICD-10-CM

## 2022-11-23 PROCEDURE — A9585 GADOBUTROL INJECTION: HCPCS | Performed by: INTERNAL MEDICINE

## 2022-11-23 PROCEDURE — 70553 MRI BRAIN W WO CONTRAST: ICD-10-PCS | Mod: 26,,, | Performed by: RADIOLOGY

## 2022-11-23 PROCEDURE — 70553 MRI BRAIN STEM W/O & W/DYE: CPT | Mod: 26,,, | Performed by: RADIOLOGY

## 2022-11-23 PROCEDURE — 25500020 PHARM REV CODE 255: Performed by: INTERNAL MEDICINE

## 2022-11-23 PROCEDURE — 70553 MRI BRAIN STEM W/O & W/DYE: CPT | Mod: TC

## 2022-11-23 RX ORDER — GADOBUTROL 604.72 MG/ML
7 INJECTION INTRAVENOUS
Status: COMPLETED | OUTPATIENT
Start: 2022-11-23 | End: 2022-11-23

## 2022-11-23 RX ADMIN — GADOBUTROL 7 ML: 604.72 INJECTION INTRAVENOUS at 11:11

## 2022-11-23 NOTE — PROGRESS NOTES
Patient was supposed to have a same-day MRI and pituitary clinic visits but it looks like she only got MRI.  Please schedule her for a visit in pituitary Clinic with labs 2 weeks prior and message Farhana to schedule same-day visit with Dr. Carlos.  I would also like her to get a thyroid ultrasound in our department before her visit or the same day as her visit since she is coming from [place].    Per last note:   [facility]  MRI 10/2022 with same day f/u with me and Dr. Carlos (labs 2 wks prior for IGF-1, GH, prl, HbA1c, fT4)

## 2023-02-06 ENCOUNTER — PATIENT MESSAGE (OUTPATIENT)
Dept: ENDOCRINOLOGY | Facility: CLINIC | Age: 68
End: 2023-02-06
Payer: MEDICARE

## 2023-02-20 ENCOUNTER — PATIENT MESSAGE (OUTPATIENT)
Dept: ENDOCRINOLOGY | Facility: CLINIC | Age: 68
End: 2023-02-20
Payer: MEDICARE

## 2023-02-20 DIAGNOSIS — E03.8 SECONDARY HYPOTHYROIDISM: ICD-10-CM

## 2023-02-20 DIAGNOSIS — D49.7 PITUITARY TUMOR: ICD-10-CM

## 2023-02-20 DIAGNOSIS — M85.80 OSTEOPENIA, UNSPECIFIED LOCATION: ICD-10-CM

## 2023-02-20 DIAGNOSIS — E22.0 ACROMEGALY: ICD-10-CM

## 2023-02-20 RX ORDER — HYDROCORTISONE 10 MG/1
TABLET ORAL
Qty: 270 TABLET | Refills: 3 | Status: SHIPPED | OUTPATIENT
Start: 2023-02-20 | End: 2023-04-24 | Stop reason: SDUPTHER

## 2023-02-28 ENCOUNTER — PATIENT MESSAGE (OUTPATIENT)
Dept: ENDOCRINOLOGY | Facility: CLINIC | Age: 68
End: 2023-02-28
Payer: MEDICARE

## 2023-02-28 DIAGNOSIS — E22.0 ACROMEGALY: ICD-10-CM

## 2023-02-28 DIAGNOSIS — D35.2 PROLACTINOMA: ICD-10-CM

## 2023-03-08 ENCOUNTER — PATIENT MESSAGE (OUTPATIENT)
Dept: ENDOCRINOLOGY | Facility: CLINIC | Age: 68
End: 2023-03-08
Payer: MEDICARE

## 2023-04-10 ENCOUNTER — PATIENT MESSAGE (OUTPATIENT)
Dept: ENDOCRINOLOGY | Facility: CLINIC | Age: 68
End: 2023-04-10
Payer: MEDICARE

## 2023-04-17 ENCOUNTER — LAB VISIT (OUTPATIENT)
Dept: LAB | Facility: HOSPITAL | Age: 68
End: 2023-04-17
Attending: INTERNAL MEDICINE
Payer: MEDICARE

## 2023-04-17 DIAGNOSIS — D49.7 PITUITARY TUMOR: ICD-10-CM

## 2023-04-17 DIAGNOSIS — Z86.39: ICD-10-CM

## 2023-04-17 LAB
ANION GAP SERPL CALC-SCNC: 8 MMOL/L (ref 8–16)
BUN SERPL-MCNC: 18 MG/DL (ref 8–23)
CALCIUM SERPL-MCNC: 9.3 MG/DL (ref 8.7–10.5)
CHLORIDE SERPL-SCNC: 110 MMOL/L (ref 95–110)
CO2 SERPL-SCNC: 26 MMOL/L (ref 23–29)
CREAT SERPL-MCNC: 0.8 MG/DL (ref 0.5–1.4)
EST. GFR  (NO RACE VARIABLE): >60 ML/MIN/1.73 M^2
ESTIMATED AVG GLUCOSE: 103 MG/DL (ref 68–131)
GLUCOSE SERPL-MCNC: 85 MG/DL (ref 70–110)
HBA1C MFR BLD: 5.2 % (ref 4–5.6)
POTASSIUM SERPL-SCNC: 4 MMOL/L (ref 3.5–5.1)
PROLACTIN SERPL IA-MCNC: 6.1 NG/ML (ref 5.2–26.5)
SODIUM SERPL-SCNC: 144 MMOL/L (ref 136–145)
T4 FREE SERPL-MCNC: 1 NG/DL (ref 0.71–1.51)

## 2023-04-17 PROCEDURE — 36415 COLL VENOUS BLD VENIPUNCTURE: CPT | Performed by: INTERNAL MEDICINE

## 2023-04-17 PROCEDURE — 80048 BASIC METABOLIC PNL TOTAL CA: CPT | Performed by: INTERNAL MEDICINE

## 2023-04-17 PROCEDURE — 83003 ASSAY GROWTH HORMONE (HGH): CPT | Performed by: INTERNAL MEDICINE

## 2023-04-17 PROCEDURE — 83036 HEMOGLOBIN GLYCOSYLATED A1C: CPT | Performed by: INTERNAL MEDICINE

## 2023-04-17 PROCEDURE — 84305 ASSAY OF SOMATOMEDIN: CPT | Performed by: INTERNAL MEDICINE

## 2023-04-17 PROCEDURE — 84146 ASSAY OF PROLACTIN: CPT | Performed by: INTERNAL MEDICINE

## 2023-04-17 PROCEDURE — 84439 ASSAY OF FREE THYROXINE: CPT | Performed by: INTERNAL MEDICINE

## 2023-04-18 LAB — GH SERPL-MCNC: 0.2 NG/ML (ref 0–8)

## 2023-04-22 LAB
IGF-I SERPL-MCNC: 104 NG/ML (ref 34–194)
IGF-I Z-SCORE SERPL: 0.25 SD

## 2023-04-24 ENCOUNTER — OFFICE VISIT (OUTPATIENT)
Dept: ENDOCRINOLOGY | Facility: CLINIC | Age: 68
End: 2023-04-24
Payer: MEDICARE

## 2023-04-24 DIAGNOSIS — D49.7 PITUITARY TUMOR: ICD-10-CM

## 2023-04-24 DIAGNOSIS — E27.49 SECONDARY ADRENAL INSUFFICIENCY: Primary | ICD-10-CM

## 2023-04-24 DIAGNOSIS — E03.8 SECONDARY HYPOTHYROIDISM: ICD-10-CM

## 2023-04-24 DIAGNOSIS — E22.0 ACROMEGALY: ICD-10-CM

## 2023-04-24 DIAGNOSIS — E27.49 SECONDARY ADRENAL INSUFFICIENCY: ICD-10-CM

## 2023-04-24 DIAGNOSIS — D35.2 PROLACTINOMA: ICD-10-CM

## 2023-04-24 DIAGNOSIS — E04.2 MULTINODULAR GOITER (NONTOXIC): ICD-10-CM

## 2023-04-24 DIAGNOSIS — Z86.39 PERSONAL HISTORY OF OTHER ENDOCRINE, NUTRITIONAL AND METABOLIC DISEASE: ICD-10-CM

## 2023-04-24 DIAGNOSIS — M85.80 OSTEOPENIA, UNSPECIFIED LOCATION: ICD-10-CM

## 2023-04-24 PROCEDURE — 99215 PR OFFICE/OUTPT VISIT, EST, LEVL V, 40-54 MIN: ICD-10-PCS | Mod: 95,,, | Performed by: INTERNAL MEDICINE

## 2023-04-24 PROCEDURE — 1159F PR MEDICATION LIST DOCUMENTED IN MEDICAL RECORD: ICD-10-PCS | Mod: CPTII,95,, | Performed by: INTERNAL MEDICINE

## 2023-04-24 PROCEDURE — 1160F RVW MEDS BY RX/DR IN RCRD: CPT | Mod: CPTII,95,, | Performed by: INTERNAL MEDICINE

## 2023-04-24 PROCEDURE — 3044F PR MOST RECENT HEMOGLOBIN A1C LEVEL <7.0%: ICD-10-PCS | Mod: CPTII,95,, | Performed by: INTERNAL MEDICINE

## 2023-04-24 PROCEDURE — 99215 OFFICE O/P EST HI 40 MIN: CPT | Mod: 95,,, | Performed by: INTERNAL MEDICINE

## 2023-04-24 PROCEDURE — 1159F MED LIST DOCD IN RCRD: CPT | Mod: CPTII,95,, | Performed by: INTERNAL MEDICINE

## 2023-04-24 PROCEDURE — 3044F HG A1C LEVEL LT 7.0%: CPT | Mod: CPTII,95,, | Performed by: INTERNAL MEDICINE

## 2023-04-24 PROCEDURE — 1160F PR REVIEW ALL MEDS BY PRESCRIBER/CLIN PHARMACIST DOCUMENTED: ICD-10-PCS | Mod: CPTII,95,, | Performed by: INTERNAL MEDICINE

## 2023-04-24 RX ORDER — CABERGOLINE 0.5 MG/1
TABLET ORAL
Qty: 16 TABLET | Refills: 5 | Status: SHIPPED | OUTPATIENT
Start: 2023-04-24 | End: 2023-05-24 | Stop reason: SDUPTHER

## 2023-04-24 RX ORDER — DEXAMETHASONE SODIUM PHOSPHATE 4 MG/ML
4 INJECTION, SOLUTION INTRA-ARTICULAR; INTRALESIONAL; INTRAMUSCULAR; INTRAVENOUS; SOFT TISSUE DAILY PRN
Qty: 1 ML | Refills: 1 | Status: SHIPPED | OUTPATIENT
Start: 2023-04-24

## 2023-04-24 RX ORDER — LEVOTHYROXINE SODIUM 75 UG/1
75 TABLET ORAL
Qty: 90 TABLET | Refills: 3 | Status: SHIPPED | OUTPATIENT
Start: 2023-04-24

## 2023-04-24 RX ORDER — HYDROCORTISONE 10 MG/1
TABLET ORAL
Qty: 270 TABLET | Refills: 3 | Status: SHIPPED | OUTPATIENT
Start: 2023-04-24

## 2023-04-24 RX ORDER — DEXAMETHASONE SODIUM PHOSPHATE 4 MG/ML
4 INJECTION, SOLUTION INTRA-ARTICULAR; INTRALESIONAL; INTRAMUSCULAR; INTRAVENOUS; SOFT TISSUE DAILY PRN
Qty: 1 ML | Refills: 1 | Status: SHIPPED | OUTPATIENT
Start: 2023-04-24 | End: 2023-04-24 | Stop reason: SDUPTHER

## 2023-04-24 NOTE — TELEPHONE ENCOUNTER
----- Message from Marva Reich sent at 4/24/2023  9:41 AM CDT -----  Contact: Sulaiman Hilario with [facility] calling to advise that Rx below is something they can not fill it would have to be sent to different pharmacy    dexAMETHasone (DECADRON) 4 mg/mL injection

## 2023-04-24 NOTE — Clinical Note
[facility], [address] [place]  Phone: [phone]  Fax [phone]    Physical Therapy Daily Treatment Note  Date:  10/5/2022    Patient Name:  Rolo Warner    :  1955  MRN: [id number]  Restrictions/Precautions:    Physician Information:   DRAKE Lazaro  Medical/Treatment Diagnosis Information:  Diagnosis: M54.50 (ICD-10-CM) - Lumbar back pain  Treatment Diagnosis: M54.50 (ICD-10-CM) - Lumbar back pain  [x] Conservative / [] Surgical - DOS:  Therapy Diagnosis/Practice Pattern:  Practice Pattern F: Spinal Disorders  Insurance/Certification information:  PT Insurance Information: [employer] 65 [employer]  - $40CP-100%-PT/OT MED [address] [place] [address] signed: [] YES  [x] NO  Number of Comorbidities:  []0     []1-2    [x]3+  Date of Patient follow up with Physician:     Is this a Progress Report:     []  Yes  [x]  No        If Yes:  Date Range for reporting period:  Beginning 2022  Ending     Progress report will be due (10 Rx or 30 days whichever is less): 5772       Recertification will be due (POC Duration  / 90 days whichever is less): 10/15/2022      Latex Allergy:  [x]NO      []YES  Preferred Language for Healthcare:   [x]English       []other:    Visit # Insurance Allowable   7 BMN  auth [x]no        []yes:       SUBJECTIVE: Pt reports no new issues. Feeling like if he did his HEP more he would be doing better.      OBJECTIVE:   Observation:  Palpation:    Test used Initial score Current Score   VAS     FOTO 57 65   Lumbar Flexion     Lumbar Ext     Lumbar SB L/R     Lumbar rotation L/R     Hip ext     ABD     TrA       RESTRICTIONS/PRECAUTIONS: a-fib, CHF    Exercises/Interventions:     Therapeutic Ex/NMR re-education  sets/reps Notes   LTR  SB DKTC  SB LTR  SB bridge 15 x  20 x  15 x  10 x    Supine TA with PPT  -BKFO  - clam 3 way  -bridge 10 x  20 x  20 x  20 x  3 x 10 RTC pituitary clinic virtual in 1 year (labs 2 wks prior for IGF-1, GH, prl, HbA1c, fT4) Needs neck US before her 1 year follow up  RVL  RVL  RVL   Side lying open book  Standing thread the needle    Standing anti rotation press 2 x 15 Blue TB   C[place]dell carry  Suitcase carry 3 laps  3 lengths 15#  20#   CC walkouts  fwd/bkwd  CC anti-rotation walkouts 10 x  10 x 5 plates  4 plates                                           Manual Intervention      Joint mobs grade 1-2, global STM 10 min                     Access Code: [email or web address]  URL: Service Seeking/  Date: 09/21/2022  Prepared by: Miguel Valdez    Exercises  Supine March with Posterior Pelvic Tilt - 1-2 x daily - 7 x weekly - 15 reps - 3 hold  Hooklying Clamshell with Resistance - 1-2 x daily - 7 x weekly - 15 reps  Hooklying Isometric Clamshell - 1-2 x daily - 7 x weekly - 15 reps  Supine Bridge with Resistance Band - 1-2 x daily - 7 x weekly - 2 sets - 10 reps  Sidelying Thoracic Rotation with Open Book - 1-2 x daily - 7 x weekly - 15 reps  Plank with Thoracic Rotation on Counter - 1-2 x daily - 7 x weekly - 15 reps  Standing Anti-Rotation Press with Anchored Resistance - 1-2 x daily - 7 x weekly - 2 sets - 15 reps  Farmer's Carry with Kettlebells - 1-2 x daily - 7 x weekly - 3 sets - 10 steps  Kettlebell Suitcase Carry - 1-2 x daily - 7 x weekly - 3 sets - 10 steps      Therapeutic Exercise and NMR EXR  [x] (12363) Provided verbal/tactile cueing for activities related to strengthening, flexibility, endurance, ROM  for improvements in proximal hip and core control with self care, mobility, lifting and ambulation.  [] (56738) Provided verbal/tactile cueing for activities related to improving balance, coordination, kinesthetic sense, posture, motor skill, proprioception  to assist with core control in self care, mobility, lifting, and ambulation.      Therapeutic Activities:    [] (16571 or 53275) Provided verbal/tactile cueing for activities related to improving balance, coordination, kinesthetic sense, posture, motor skill, proprioception and motor activation to allow for proper function  with self care and ADLs  [] (83246) Provided training and instruction to the patient for proper core and proximal hip recruitment and positioning with ambulation re-education     Home Exercise Program:    [x] (27938) Reviewed/Progressed HEP activities related to strengthening, flexibility, endurance, ROM of core, proximal hip and LE for functional self-care, mobility, lifting and ambulation   [] (68895) Reviewed/Progressed HEP activities related to improving balance, coordination, kinesthetic sense, posture, motor skill, proprioception of core, proximal hip and LE for self care, mobility, lifting, and ambulation      Manual Treatments:  PROM / STM / Oscillations-Mobs:  G-I, II, III, IV (PA's, Inf., Post.)  [x] (93865) Provided manual therapy to mobilize proximal hip and LS spine soft tissue/joints for the purpose of modulating pain, promoting relaxation,  increasing ROM, reducing/eliminating soft tissue swelling/inflammation/restriction, improving soft tissue extensibility and allowing for proper ROM for normal function with self care, mobility, lifting and ambulation. Modalities:      [] GR/ESU 15 min    [] GR 15 min  [] ESU     [] CP    [] MHP    [x] declined  Charges:  Timed Code Treatment Minutes: 40   Total Treatment Minutes: 40     [] EVAL (LOW) 36553 (typically 20 minutes face-to-face)  [] EVAL (MOD) 94664 (typically 30 minutes face-to-face)  [] EVAL (HIGH) 68176 (typically 45 minutes face-to-face)  [] RE-EVAL     [x] PA(46164) x  2   [] IONTO  [] NMR (27817) x     [] VASO  [x] Manual (61166) x 1     [] Other:  [] TA x      [] Mech Traction (50972)  [] ES(attended) (35276)      [] ES (un) (46329):     Goals: Patient stated goal: Pain free work  [] Progressing: [] Met: [] Not Met: [] Adjusted     Therapist goals for Patient:   Short Term Goals: To be achieved in: 2 weeks  1. Independent in HEP and progression per patient tolerance, in order to prevent re-injury.    [] Progressing: [] Met: [] Not Met: [] Adjusted   2. Patient will have a decrease in pain to facilitate improvement in movement, function, and ADLs as indicated by Functional Deficits. [] Progressing: [] Met: [] Not Met: [] Adjusted     Long Term Goals: To be achieved in: 12 weeks  1. Disability index score of 60 or greater on FOTO outcome measure to assist with reaching prior level of function. [] Progressing: [] Met: [] Not Met: [] Adjusted   2. Patient will demonstrate increased AROM to WNL, good LS mobility, good hip ROM to allow for proper joint functioning as indicated by patients Functional Deficits. [] Progressing: [] Met: [] Not Met: [] Adjusted   3. Patient will demonstrate an increase in Strength to good proximal hip and core activation to allow for proper functional mobility as indicated by patients Functional Deficits. [] Progressing: [] Met: [] Not Met: [] Adjusted   4. Patient will return to dressing and hygiene ADLs and functional activities without increased symptoms or restriction. [] Progressing: [] Met: [] Not Met: [] Adjusted       Overall Progression Towards Functional goals/ Treatment Progress Update:  [x] Patient is progressing as expected towards functional goals listed. [] Progression is slowed due to complexities/Impairments listed. [] Progression has been slowed due to co-morbidities. [] Plan just implemented, too soon to assess goals progression <30days   [] Goals require adjustment due to lack of progress  [] Patient is not progressing as expected and requires additional follow up with physician  [] Other    Prognosis for POC: [x] Good [] Fair  [] Poor      Patient requires continued skilled intervention: [x] Yes  [] No      ASSESSMENT:  Patient improving well when consistent with strengthening. Will check back in a few weeks and try to be more consistent on his own in the mean time.     Treatment/Activity Tolerance:  [x] Patient tolerated treatment well [] Patient limited by fatigue  [] Patient limited by pain  [] Patient limited by other medical complications  [] Other:       PLAN: See eval  [] Continue per plan of care [] Alter current plan (see comments above)  [x] Plan of care initiated [] Hold pending MD visit [] Discharge      Electronically signed by:  Carmen Leavitt, PT , DPT [id number]    Note: If patient does not return for scheduled/ recommended follow up visits, this note will serve as a discharge from care along with most recent update on progress.

## 2023-04-24 NOTE — ASSESSMENT & PLAN NOTE
8/2021 neck ultrasound with decrease in size of right dominant nodule that was previously benign on bxy.  Will continue to monitor with repeat neck ultrasound in 1 year or sooner if clinical change

## 2023-04-24 NOTE — PATIENT INSTRUCTIONS
"    Please talk to your local doctor about doing a sleep study.     Ask your eye doctor's office to fax me the results of your next visual field test.   Fax number:  [phone]    Adrenal insufficiency self-care instructions and sick day rules     Adrenal insufficiency, which involves a deficiency in steroid hormones that are normally produced by the body, can result in symptoms that include generalized and severe fatigue, malaise, dizziness, and low blood pressure. Should you develop any of these symptoms, please call us immediately or go to the ER if severe symptoms develop. You may also take your emergency intramuscular dexamethasone injection if you have this available    Please refer to the following instructions for patients with adrenal insufficiency:  1. Please get an alert bracelet that says "Adrenal insufficiency. Give stress doses of steroids in case of illness."     2. If you become nauseous and are unable to keep hydrocortisone down, you need to go to an emergency room to get this medication via IV.     3. If you are ill with a low-grade fever of  F, please double your dose of hydrocortisone. If you have a fever of >100 F, please triple your hydrocortisone dose for 3 days or until fever resolves.     4. If you are undergoing a planned medical procedure (such as minor surgery, colonoscopy) or a major dental procedure (tooth extraction, root canal etc) you should double your dose the day before and the day of and the day after the procedure.    5. If a major surgery requiring general anesthesia is being planned, please notify your endocrinologist so that we can give instructions to the anesthesia team that will be taking care of you with regards to the amount of hydrocortisone to be given during surgery.    Please call us with any questions and to notify us that you are ill at home or are heading to ER/hospital so that we can help you through the situation and communicate with the physicians " taking care of you.

## 2023-04-24 NOTE — PROGRESS NOTES
Subjective:    04/24/2023    The patient location is: home    Visit type: audiovisual    Face to Face time with patient: 25  42 minutes of total time spent on the encounter, which includes face to face time and non-face to face time preparing to see the patient (eg, review of tests), Obtaining and/or reviewing separately obtained history, Documenting clinical information in the electronic or other health record, Independently interpreting results (not separately reported) and communicating results to the patient/family/caregiver, or Care coordination (not separately reported).     Each patient to whom he or she provides medical services by telemedicine is:  (1) informed of the relationship between the physician and patient and the respective role of any other health care provider with respect to management of the patient; and (2) notified that he or she may decline to receive medical services by telemedicine and may withdraw from such care at any time.    The patient's last visit with me was on 8/17/2021.    Patient ID: Mary Kay Schwartz is a 68 y.o. female.    Chief Complaint:  F/u panhypopit, acromegaly    History of Present Illness  Mary Kay Schwartz is here for follow up of pituitary adenoma.  Previously seen by Dr. Klein.        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  Her periods came back and she had symptom free with normal labs for years until May 2010  when she started to see blind spots that became worse thereafter with visual field defects, she then had an MRI that showed the mass to grow to 6.1 cm, she then was evaluated further with hormonal w/u showing high IGF1 and prolactin with increased alpha subunit.  She underwent TSR for debulking w/ resolution of visual complaints followed by GKRS.  Now  with normal PRL and IGF-1 on cabergoline and panhypopit ( w/o DI).      S/p Two transphenoidal resections in 12/10 and 8/11 for debulking with some residual tumor  Pathology report:  [place] DIAGNOSIS:  PITUITARY MASS, BIOPSY ([id number]; 12/8/2010): PITUITARY ADENOMA, PLURIHORMONAL (GROWTH HORMONE, PROLACTIN AND ALPHA-SUBUNIT)     peak IGF-1 545 prl 85 in 2011 2011 started Lanreotide ([id number])  GK radiosurgery 3/2012.   Started cabergoline 4/2012  prl normalized 7/2012  IGF-1 normalized 4/2013 2015 stopped lanreotide and IGF-1 stayed normal   2015 hospitalizations for hyponatremia     Interval Hx:  Denies new medical problems or concerns since last visit  Denies new HA/vision change  Last HVF with local eye doctor 2-3 years ago, will schedule soon   No symptoms of DI (denies polyuria/polydipsia).       Pituitary MRI 11/23/22- stable residual    There are chronic postsurgical changes from prior transsphenoidal sellar/suprasellar lesion resection.  There is a small chronic heterogeneous enhancing mass along the posterior aspect of the sella again extending suprasellar measuring 0.8 cm AP x 1.2 cm transverse by 1.5 cm cc.  This is not significantly changed in size and configuration over the interval.      Recent labs: normal.  In the past low FSH from GKRS   Latest Reference Range & Units 04/17/23 08:21   Hemoglobin A1C External 4.0 - 5.6 % 5.2   Estimated Avg Glucose 68 - 131 mg/dL 103   Growth Hormone 0.0 - 8.0 ng/mL 0.2   Somatomedin (IGF-I) 34 - 194 ng/mL 104   Free T4 0.71 - 1.51 ng/dL 1.00   Prolactin 5.2 - 26.5 ng/mL 6.1     Regarding Acromegaly/prolacin secreting tumor:  Remains on Cabergoline 0.25mg (half tablet) three times a week- no SE  Normal prl and IGF-1, GH all normal     Acromegaly symptom tracker:  04/24/2023     Symptom severity over the past week    1     2     3     4     5     6     7     8   Headache      [x]   []    []   []   []   []   []   []     Excessive        1     2     3     4     5      6     7     8   Sweating        [x]   []    []   []   []   []   []   []     Joint             1     2     3     4     5     6     7     8   Pain            []   []    []   [x]   []   []   []   []       1     2     3     4     5     6     7     8   Fatigue          []   []    [x]   []   []   []   []   []     Soft Tissue     1     2     3     4     5     6     7     8   Swelling         [x]   []    []   []   []   []   []   []     Today's score: 10  Last score: NA    Screening:  Colonoscopy - 10/2021 with no polys, plans to repeat in 10/2026 5 years)  Echo - normal in 12/2015 with normal IGF-1 since then  Neck US - hx of MNG, schedule repeat US in next few mo (see below for details)  MATILDA - will schedule sleep study, + snoring  HVF- recommend yearly vision check, will fax note from local eye doc.      Lab Results   Component Value Date    PROLACTIN 6.1 04/17/2023    PROLACTIN 7.8 08/24/2021    PROLACTIN 6.2 06/12/2020    PROLACTIN 15.3 09/27/2018    PROLACTIN 8.5 10/03/2017    PROLACTIN 18.4 07/19/2017     Last IGF-1:   Lab Results   Component Value Date    SOMATMDN 104 04/17/2023    SOMATMDN 119 08/24/2021    SOMATMDN 93 06/12/2020    SOMATMDN 142 09/27/2018    SOMATMDN 133 10/03/2017    SOMATMDN 158 07/10/2017        Regarding Secondary adrenal insufficiency:  Compliant with HC (started 2/2016) 10mg in the AM 5mg in the PM   Aware of sick day rules  Does not have emergency dexamethasone   Doesn't have medical alert tag (carried a card w/ her)  denies symptoms of adrenal insufficiency (no lightheadedness, N/V/abd pain, hypotension).       Regarding secondary hypothyroidism:  Compliant w/ LT4 75mcg one tablet daily- take appropriately  Clinically euthryoid  Lab Results   Component Value Date    TSH 0.014 (L) 06/12/2020    FREET4 1.00 04/17/2023         With regards to thyroid nodule:  Denies any changes in neck, no compressive symptoms.     8/24/21  The thyroid lobes are normal in size and echogenicity measuring 3.5 x  1.4 x 1.2 cm on the right and 3.5 x 0.8 x 1.0 cm on left.  The isthmus measures 0.23 cm.  The thyroid lobes demonstrate normal blood flow by color Doppler.     There is a round solid appearing hypoechoic nodule of the inferior right thyroid lobe measuring 0.80 x 0.40 x 0.52 cm.  This is a TI-RADS 4 nodule and does not meet criteria for FNA.  There is an adjacent solid-appearing slightly hypoechoic nodule of the right thyroid lobe measuring 0.94 x 0.47 x 0.76 cm.  This is a TI-RADS 4 nodule and meets criteria for ultrasound follow-up.  Within the mid right thyroid lobe there is a solid slightly hypoechoic nodule measuring 1.7 x 0.75 x 0.98 cm.  This is a TI-RADS 4 nodule and meets criteria for FNA.     No cystic or solid nodules of the left thyroid lobe.  No significant lymphadenopathy identified.     Impression:  1. TI-RADS 4 nodule of the right thyroid lobe which was previously benign on bxy   2. TI-RADS 4 nodule of the right thyroid lobe which meets criteria for ultrasound follow-up.  This report was flagged in Epic as abnormal.       Thyroid US: 10/11/18  Multinodular thyroid gland as described above.  No previous imaging studies are available for comparison.  Surveillance with a repeat ultrasound in 6-12 months is recommended.    FNA:   12/16/16  THYROID GLAND, RIGHT LOBE, FNA (CYTOLOGY):  [facility] System Thyroid Cytology Category: Follicular Lesion of Undetermined Significance (FLUS).  Cellular specimen with crowded groups of follicular epithelial cells with some microfocllicles, a follicular neoplasm  cannot be entirely excluded.  Scant colloid present.  9/20/17  Right thyroid, fine-needle aspiration:  [facility] System Thyroid Cytology Category: Benign.  Benign follicular epithelial cell groups and colloid present.  Diagnosed    FH of thyroid cancer: Denies  Personal history of radiation treatment or exposure: Denies      With regards to osteoporosis:     Follows with orthopedics - [facility] - stopped due to SE  (thigh and leg pain).      Lab Review:      Pituitary MRI 10/2020- stable residual w/o change over 2 years (repeat in 2 years)  There are findings of postoperative change from transsphenoidal sellar/suprasellar lesion resection.  The remains heterogeneous signal within the sella with enhancing focus along the posterior aspect of the sella extending suprasellar measuring approximately 1.6 by 0.8 by 1.3 cm cranial caudal, AP, transversely previously measured at 1.7 x 0.8 x 1.4 cm not significantly changed.  As noted before there is extension along the posterior aspect of the right cavernous sinus and abutting the optic chiasma      MRI 10/9/18  Sella: Remote operative change from transsphenoidal sellar/suprasellar lesion resection.  There is continued heterogeneous the adrenal within the floor of the sella with continued enhancing focus within the posterior sella extending along the suprasellar cistern this measures approximately 0.8 by 1.4 x 1.7 cm in AP by TV by CC dimensions respectively.  There is continued extension along the posterior aspect of the right cavernous sinus without definite cavernous sinus involvement.  There is continued abutment of the right aspect of the optic chiasm allowing as well as the floor of the right hypothalamus and possible encasement of the right posterior cerebral artery  No significant increased mass effect on the suprasellar neurovascular structures.  IMPRESSION:  No significant change from prior.  Continued heterogeneous signal within the floor of the sella with operative change from transsphenoidal sellar/suprasellar lesion resection.  There is continued though relatively stable residual lesion along the posterior aspect of the sella         Assessment and Plan     Problem List Items Addressed This Visit          1 - High    Acromegaly     Clinically doing well with last IGF-1 and prl normal  s/p debulking surgery x 2 and GKRS, still oncabergoline 1/2 tab three times a week.        I have independently reviewed the pituitary MRI from 11/2022 which shows stable residual tumor with suprasellar component. No apparent impingement upon the optic apparatus.       Would like to her to continue to follow up with Dr. Carlos for monitoring of residual tumor.     She is up to date on all screening aside from hvf (goode visual fields) and sleep study which she will schedule    Will continue current regimen and repeat labs 2 wks prior to 1 year follow up              Relevant Medications    hydrocortisone (CORTEF) 10 MG Tab    cabergoline (DOSTINEX) 0.5 mg tablet    Other Relevant Orders    Insulin-Like Growth Factor    Prolactin    T4, Free    Growth Hormone    Hemoglobin A1C       2     Secondary hypothyroidism     Currently on LT4 75 mcg daily and clinically euthyroid with normal fT4 (it is in the lower half of normal range but given age and lack of symptoms will keep current dose of levothyroxine)           Relevant Medications    levothyroxine (SYNTHROID) 75 MCG tablet    hydrocortisone (CORTEF) 10 MG Tab    Other Relevant Orders    T4, Free       3     Secondary adrenal insufficiency - Primary     Continue HC 10/5, sick day rules reviewed  Likely permanent 2/2 GKRS so will stop checking cortisol levels    Prescription for emergency IM dexamethasone sent           Relevant Medications    dexAMETHasone (DECADRON) 4 mg/mL injection    hydrocortisone (CORTEF) 10 MG Tab       Unprioritized    Multinodular goiter (nontoxic)     8/2021 neck ultrasound with decrease in size of right dominant nodule that was previously benign on bxy.  Will continue to monitor with repeat neck ultrasound in 1 year or sooner if clinical change           Osteopenia    Pituitary tumor    Relevant Orders    Hemoglobin A1C    Prolactinoma    Relevant Medications    cabergoline (DOSTINEX) 0.5 mg tablet     Other Visit Diagnoses       Personal history of other endocrine, nutritional and metabolic disease        Relevant Orders     Hemoglobin A1C          RTC pituitary clinic virtual in 1 year (labs 2 wks prior for IGF-1, GH, prl, HbA1c, fT4)  Needs neck US before her 1 year follow up     Ramona Forde MD

## 2023-04-24 NOTE — TELEPHONE ENCOUNTER
----- Message from Marva Reich sent at 4/24/2023  9:41 AM CDT -----  Contact: Sulaiman Hilario with [facility] calling to advise that Rx below is something they can not fill it would have to be sent to different pharmacy    dexAMETHasone (DECADRON) 4 mg/mL injection

## 2023-04-24 NOTE — ASSESSMENT & PLAN NOTE
Currently on LT4 75 mcg daily and clinically euthyroid with normal fT4 (it is in the lower half of normal range but given age and lack of symptoms will keep current dose of levothyroxine)

## 2023-04-24 NOTE — Clinical Note
Patient is overdue for virtual visit with Dr. Carlos.  I saw her today and everything looks good but she had an MRI in 11/2022 that was read as stable but I would like dr. Carlos to review it and continue to monitor her b/c of hx of acromegaly and gamma knife. Can you please help get her scheduled?

## 2023-04-24 NOTE — TELEPHONE ENCOUNTER
Called patient to inform her that [facility] couldn't fill rx for her . Patient stated that she will ,sharon it sent to [facility]

## 2023-04-24 NOTE — ASSESSMENT & PLAN NOTE
Continue HC 10/5, sick day rules reviewed  Likely permanent 2/2 GKRS so will stop checking cortisol levels    Prescription for emergency IM dexamethasone sent

## 2023-04-24 NOTE — ASSESSMENT & PLAN NOTE
Clinically doing well with last IGF-1 and prl normal  s/p debulking surgery x 2 and GKRS, still oncabergoline 1/2 tab three times a week.       I have independently reviewed the pituitary MRI from 11/2022 which shows stable residual tumor with suprasellar component. No apparent impingement upon the optic apparatus.       Would like to her to continue to follow up with Dr. Carlos for monitoring of residual tumor.     She is up to date on all screening aside from hvf (goode visual fields) and sleep study which she will schedule    Will continue current regimen and repeat labs 2 wks prior to 1 year follow up

## 2023-05-02 ENCOUNTER — OFFICE VISIT (OUTPATIENT)
Dept: NEUROSURGERY | Facility: CLINIC | Age: 68
End: 2023-05-02
Payer: MEDICARE

## 2023-05-02 DIAGNOSIS — E22.0 ACROMEGALY: Primary | ICD-10-CM

## 2023-05-02 DIAGNOSIS — D35.2 PROLACTINOMA: ICD-10-CM

## 2023-05-02 PROCEDURE — 3044F PR MOST RECENT HEMOGLOBIN A1C LEVEL <7.0%: ICD-10-PCS | Mod: CPTII,95,, | Performed by: NEUROLOGICAL SURGERY

## 2023-05-02 PROCEDURE — 1160F PR REVIEW ALL MEDS BY PRESCRIBER/CLIN PHARMACIST DOCUMENTED: ICD-10-PCS | Mod: CPTII,95,, | Performed by: NEUROLOGICAL SURGERY

## 2023-05-02 PROCEDURE — 3044F HG A1C LEVEL LT 7.0%: CPT | Mod: CPTII,95,, | Performed by: NEUROLOGICAL SURGERY

## 2023-05-02 PROCEDURE — 1159F PR MEDICATION LIST DOCUMENTED IN MEDICAL RECORD: ICD-10-PCS | Mod: CPTII,95,, | Performed by: NEUROLOGICAL SURGERY

## 2023-05-02 PROCEDURE — 1159F MED LIST DOCD IN RCRD: CPT | Mod: CPTII,95,, | Performed by: NEUROLOGICAL SURGERY

## 2023-05-02 PROCEDURE — 99214 OFFICE O/P EST MOD 30 MIN: CPT | Mod: 95,,, | Performed by: NEUROLOGICAL SURGERY

## 2023-05-02 PROCEDURE — 99214 PR OFFICE/OUTPT VISIT, EST, LEVL IV, 30-39 MIN: ICD-10-PCS | Mod: 95,,, | Performed by: NEUROLOGICAL SURGERY

## 2023-05-02 PROCEDURE — 1160F RVW MEDS BY RX/DR IN RCRD: CPT | Mod: CPTII,95,, | Performed by: NEUROLOGICAL SURGERY

## 2023-05-02 NOTE — PATIENT INSTRUCTIONS
I have personally reviewed the MRI brain with the pt which shows:  1. Stable postsurgical change of the sella from prior trans-sphenoidal lesion resection with chronic stable enhancing lesion of the posterior sella/suprasella not significantly changed over the interval.  2. Cortical atrophy with periventricular deep white matter change consistent with chronic small vessel ischemic disease.    I will schedule the patient for 2 year follow up with MRI brain.

## 2023-05-02 NOTE — PROGRESS NOTES
The patient location is: [place]  The chief complaint leading to consultation is: follow up    Visit type: audiovisual    Face to Face time with patient: 10 minutes of total time spent on the encounter, which includes face to face time and non-face to face time preparing to see the patient (eg, review of tests), Obtaining and/or reviewing separately obtained history, Documenting clinical information in the electronic or other health record, Independently interpreting results (not separately reported) and communicating results to the patient/family/caregiver, or Care coordination (not separately reported).         Each patient to whom he or she provides medical services by telemedicine is:  (1) informed of the relationship between the physician and patient and the respective role of any other health care provider with respect to management of the patient; and (2) notified that he or she may decline to receive medical services by telemedicine and may withdraw from such care at any time.    Notes:    Subjective:   Kristin PINTO, attest that this documentation has been prepared under the direction and in the presence of Quentin Carlos MD.     Patient ID: Mary Kay Schwartz is a 68 y.o. female     Chief Complaint: No chief complaint on file.      HPI  Ms. Mary Kay Schwartz is a 68 y.o. woman with pituitary adenoma, acromegaly, and prolactinoma who presents today for her 2 year follow up with MRI brain. Today the patient reports she is doing well in the interim. She is looking forward to [facility] this year. Her energy levels have remained at baseline. Labs WNL. Pt with no other complaints or concerns.    Review of Systems   Constitutional:  Negative for activity change, appetite change, fatigue, fever and unexpected weight change.   HENT:  Negative for facial swelling.    Eyes: Negative.    Respiratory: Negative.     Cardiovascular: Negative.    Gastrointestinal:  Negative for diarrhea, nausea and vomiting.   Endocrine: Negative.     Genitourinary: Negative.    Musculoskeletal:  Negative for back pain, joint swelling, myalgias and neck pain.   Neurological:  Negative for dizziness, seizures, weakness, numbness and headaches.   Psychiatric/Behavioral: Negative.        Past Medical History:   Diagnosis Date    Acromegaly     Allergy     VITAMIN D DEFICIENCY    DVT (deep venous thrombosis)     Factor V Leiden mutation     GERD (gastroesophageal reflux disease)     Goiter 10/18/2016    IGT (impaired glucose tolerance)     Menopause present     Migraine headache     Osteoporosis, unspecified     Pituitary macroadenoma     s/p transphenoidal surgery on 12/8/10 by Dr. Hernandez; s/p transphenoidal surgery by Dr. Carlos on  8/23/2011; s/p radiosurgery with 14 Gy to the 50% isodose  line on 3/15/2012    Prolactinoma     Protein C deficiency     Prothrombin gene mutation     Vitamin D deficiency disease        Objective:    There were no vitals filed for this visit.   Physical Exam  Constitutional:       General: She is not in acute distress.     Appearance: Normal appearance.   HENT:      Head: Normocephalic and atraumatic.   Neurological:      Mental Status: She is alert and oriented to person, place, and time.       IMAGING:  MRI Brain W W/O Contrast (11/23/2022):  1. Stable postsurgical change of the sella from prior trans-sphenoidal lesion resection with chronic stable enhancing lesion of the posterior sella/suprasella not significantly changed over the interval.  2. Cortical atrophy with periventricular deep white matter change consistent with chronic small vessel ischemic disease.    I have personally reviewed the images with the pt.      I, Dr. Quentin Carlos, personally performed the services described in this documentation. All medical record entries made by the scribe, Kristin Whiteside, were at my direction and in my presence.  I have reviewed the chart and agree that the record reflects my personal performance and is accurate and complete. Quentin Carlos,  MD. 05/02/2023    Assessment:     Acromegaly     Plan:   I have personally reviewed the MRI brain with the pt which shows:  1. Stable postsurgical change of the sella from prior trans-sphenoidal lesion resection with chronic stable enhancing lesion of the posterior sella/suprasella not significantly changed over the interval.  2. Cortical atrophy with periventricular deep white matter change consistent with chronic small vessel ischemic disease.    I will schedule the patient for 2 year follow up with MRI brain.

## 2023-05-31 ENCOUNTER — PATIENT MESSAGE (OUTPATIENT)
Dept: ENDOCRINOLOGY | Facility: CLINIC | Age: 68
End: 2023-05-31
Payer: MEDICARE

## 2023-10-28 ENCOUNTER — OFFICE VISIT (OUTPATIENT)
Dept: URGENT CARE | Facility: CLINIC | Age: 68
End: 2023-10-28
Payer: MEDICARE

## 2023-10-28 VITALS
TEMPERATURE: 98 F | HEIGHT: 66 IN | RESPIRATION RATE: 18 BRPM | BODY MASS INDEX: 26.03 KG/M2 | HEART RATE: 72 BPM | DIASTOLIC BLOOD PRESSURE: 70 MMHG | SYSTOLIC BLOOD PRESSURE: 118 MMHG | WEIGHT: 162 LBS | OXYGEN SATURATION: 98 %

## 2023-10-28 DIAGNOSIS — H66.91 RIGHT OTITIS MEDIA, UNSPECIFIED OTITIS MEDIA TYPE: Primary | ICD-10-CM

## 2023-10-28 PROCEDURE — 99203 PR OFFICE/OUTPT VISIT, NEW, LEVL III, 30-44 MIN: ICD-10-PCS | Mod: S$GLB,,, | Performed by: NURSE PRACTITIONER

## 2023-10-28 PROCEDURE — 99203 OFFICE O/P NEW LOW 30 MIN: CPT | Mod: S$GLB,,, | Performed by: NURSE PRACTITIONER

## 2023-10-28 RX ORDER — PREDNISONE 10 MG/1
10 TABLET ORAL DAILY
Qty: 4 TABLET | Refills: 0 | Status: SHIPPED | OUTPATIENT
Start: 2023-10-28 | End: 2023-11-01

## 2023-10-28 RX ORDER — AMOXICILLIN 500 MG/1
500 CAPSULE ORAL EVERY 8 HOURS
Qty: 30 CAPSULE | Refills: 0 | Status: SHIPPED | OUTPATIENT
Start: 2023-10-28 | End: 2023-11-07

## 2023-10-28 NOTE — PROGRESS NOTES
"Subjective:      Patient ID: Mary Kay Schwartz is a 68 y.o. female.    Vitals:  height is 5' 6" (1.676 m) and weight is 73.5 kg (162 lb). Her oral temperature is 98.1 °F (36.7 °C). Her blood pressure is 118/70 and her pulse is 72. Her respiration is 18 and oxygen saturation is 98%.     Chief Complaint: Otalgia (Right ear drainage this am. Patient states she had tube dilated 6 weeks ago and all has been well until this am.)    This is a 68 y.o. female who presents today with a chief complaint of right ear drainage (blood) this am. Patient states she had eustachian tubes dilated 6 weeks ago.    Otalgia   There is pain in the right ear. This is a new problem. The current episode started today. The problem occurs constantly. The problem has been unchanged. There has been no fever. The fever has been present for Less than 1 day. The pain is at a severity of 0/10. The patient is experiencing no pain. Associated symptoms include ear discharge. She has tried nothing for the symptoms. The treatment provided no relief.       HENT:  Positive for ear pain and ear discharge.       Objective:     Physical Exam   Constitutional: She is oriented to person, place, and time. normal  HENT:   Head: Normocephalic and atraumatic.   Ears:   Right Ear: External ear normal.   Left Ear: Tympanic membrane, external ear and ear canal normal.      Comments: Green myringotomy tube in place to right TM. Right TM has some mild erythema. There is some scant dried blood in right ear canal.    Green myringotomy tube in place to left TM.   Nose: Nose normal.   Mouth/Throat: Mucous membranes are moist. Oropharynx is clear.   Eyes: Conjunctivae are normal. Extraocular movement intact   Neck: Neck supple.   Cardiovascular: Normal rate, regular rhythm, normal heart sounds and normal pulses.   Pulmonary/Chest: Effort normal and breath sounds normal.   Abdominal: Normal appearance.   Musculoskeletal: Normal range of motion.         General: Normal range of " motion.   Neurological: She is alert and oriented to person, place, and time.   Skin: Skin is warm and dry.   Vitals reviewed.      Assessment:     1. Right otitis media, unspecified otitis media type        Plan:   Medications as prescribed. Follow up with ENT as advised.    Right otitis media, unspecified otitis media type  -     amoxicillin (AMOXIL) 500 MG capsule; Take 1 capsule (500 mg total) by mouth every 8 (eight) hours. for 10 days  Dispense: 30 capsule; Refill: 0  -     predniSONE (DELTASONE) 10 MG tablet; Take 1 tablet (10 mg total) by mouth once daily. for 4 days  Dispense: 4 tablet; Refill: 0

## 2024-01-24 ENCOUNTER — OFFICE VISIT (OUTPATIENT)
Dept: FAMILY MEDICINE | Facility: CLINIC | Age: 69
End: 2024-01-24
Payer: MEDICARE

## 2024-01-24 ENCOUNTER — TELEPHONE (OUTPATIENT)
Dept: FAMILY MEDICINE | Facility: CLINIC | Age: 69
End: 2024-01-24
Payer: MEDICARE

## 2024-01-24 VITALS
HEIGHT: 66 IN | DIASTOLIC BLOOD PRESSURE: 70 MMHG | WEIGHT: 163 LBS | SYSTOLIC BLOOD PRESSURE: 131 MMHG | BODY MASS INDEX: 26.2 KG/M2

## 2024-01-24 DIAGNOSIS — K21.9 GASTROESOPHAGEAL REFLUX DISEASE, UNSPECIFIED WHETHER ESOPHAGITIS PRESENT: ICD-10-CM

## 2024-01-24 DIAGNOSIS — E22.0 ACROMEGALY: ICD-10-CM

## 2024-01-24 DIAGNOSIS — F41.9 ANXIETY: ICD-10-CM

## 2024-01-24 DIAGNOSIS — D35.2 PROLACTINOMA: ICD-10-CM

## 2024-01-24 DIAGNOSIS — Z79.899 CHRONIC PRESCRIPTION BENZODIAZEPINE USE: Primary | ICD-10-CM

## 2024-01-24 DIAGNOSIS — D68.9 COAGULATION DISORDER: ICD-10-CM

## 2024-01-24 LAB
AMPHET+METHAMPHET UR QL: NEGATIVE
BARBITURATES UR QL SCN>200 NG/ML: ABNORMAL
BENZODIAZ UR QL SCN>200 NG/ML: NEGATIVE
BZE UR QL SCN: NEGATIVE
CANNABINOIDS UR QL SCN: NEGATIVE
CREAT UR-MCNC: 152 MG/DL (ref 15–325)
METHADONE UR QL SCN>300 NG/ML: NEGATIVE
OPIATES UR QL SCN: NEGATIVE
PCP UR QL SCN>25 NG/ML: NEGATIVE
TOXICOLOGY INFORMATION: ABNORMAL

## 2024-01-24 PROCEDURE — 3075F SYST BP GE 130 - 139MM HG: CPT | Mod: CPTII,S$GLB,, | Performed by: FAMILY MEDICINE

## 2024-01-24 PROCEDURE — 1160F RVW MEDS BY RX/DR IN RCRD: CPT | Mod: CPTII,S$GLB,, | Performed by: FAMILY MEDICINE

## 2024-01-24 PROCEDURE — 1101F PT FALLS ASSESS-DOCD LE1/YR: CPT | Mod: CPTII,S$GLB,, | Performed by: FAMILY MEDICINE

## 2024-01-24 PROCEDURE — 3078F DIAST BP <80 MM HG: CPT | Mod: CPTII,S$GLB,, | Performed by: FAMILY MEDICINE

## 2024-01-24 PROCEDURE — 1159F MED LIST DOCD IN RCRD: CPT | Mod: CPTII,S$GLB,, | Performed by: FAMILY MEDICINE

## 2024-01-24 PROCEDURE — 3008F BODY MASS INDEX DOCD: CPT | Mod: CPTII,S$GLB,, | Performed by: FAMILY MEDICINE

## 2024-01-24 PROCEDURE — 80307 DRUG TEST PRSMV CHEM ANLYZR: CPT | Performed by: FAMILY MEDICINE

## 2024-01-24 PROCEDURE — 99999 PR PBB SHADOW E&M-EST. PATIENT-LVL III: CPT | Mod: PBBFAC,,, | Performed by: FAMILY MEDICINE

## 2024-01-24 PROCEDURE — 3288F FALL RISK ASSESSMENT DOCD: CPT | Mod: CPTII,S$GLB,, | Performed by: FAMILY MEDICINE

## 2024-01-24 PROCEDURE — 99214 OFFICE O/P EST MOD 30 MIN: CPT | Mod: S$GLB,,, | Performed by: FAMILY MEDICINE

## 2024-01-24 RX ORDER — ALPRAZOLAM 0.25 MG/1
0.25 TABLET ORAL 2 TIMES DAILY PRN
Qty: 100 TABLET | Refills: 0 | Status: SHIPPED | OUTPATIENT
Start: 2024-01-24

## 2024-01-24 RX ORDER — SUCRALFATE 1 G/1
1 TABLET ORAL
Qty: 360 TABLET | Refills: 3 | Status: SHIPPED | OUTPATIENT
Start: 2024-01-24

## 2024-01-24 RX ORDER — TRIAMCINOLONE ACETONIDE 55 UG/1
2 SPRAY, METERED NASAL
COMMUNITY
Start: 2023-02-10

## 2024-01-24 RX ORDER — BUTALBITAL, ACETAMINOPHEN AND CAFFEINE 50; 325; 40 MG/1; MG/1; MG/1
1 TABLET ORAL EVERY 4 HOURS PRN
COMMUNITY
Start: 2023-11-15

## 2024-01-24 NOTE — PROGRESS NOTES
Subjective:       Patient ID: Mary Kay Schwartz is a 68 y.o. female.    Chief Complaint: Medication Refill    Ms. Schwartz presents today to follow up.   She regularly followed with Dr. Willie Bingham.     She was getting both carafate and occasional xanax from him. Last fill was in 2022.   She has a lot of GI side effects from stress and has noted that she had significant abdominal pain.     Medication Refill  Pertinent negatives include no abdominal pain, fatigue, fever or rash.     Review of Systems   Constitutional:  Negative for activity change, appetite change, fatigue and fever.   Respiratory:  Negative for shortness of breath.    Gastrointestinal:  Negative for abdominal pain.   Integumentary:  Negative for rash.         Objective:      Physical Exam  Vitals and nursing note reviewed.   Constitutional:       General: She is not in acute distress.     Appearance: She is not ill-appearing.   Cardiovascular:      Rate and Rhythm: Normal rate and regular rhythm.      Heart sounds: No murmur heard.  Pulmonary:      Effort: Pulmonary effort is normal.      Breath sounds: Normal breath sounds. No wheezing.   Skin:     General: Skin is warm and dry.      Findings: No rash.   Neurological:      Mental Status: She is alert.   Psychiatric:         Mood and Affect: Mood normal.         Behavior: Behavior normal.         Assessment:       1. Chronic prescription benzodiazepine use    2. Anxiety    3. Gastroesophageal reflux disease, unspecified whether esophagitis present    4. Coagulation disorder    5. Prolactinoma    6. Acromegaly        Plan:       Problem List Items Addressed This Visit          Psychiatric    Anxiety    Relevant Medications    ALPRAZolam (XANAX) 0.25 MG tablet    Other Relevant Orders    Drug screen panel, in-house       Hematology    Coagulation disorder       Endocrine    Acromegaly     Stable. Follows with endocrine.         Prolactinoma     Chronic. Follows with specialty team            GI    GERD  (gastroesophageal reflux disease)    Relevant Medications    sucralfate (CARAFATE) 1 gram tablet     Other Visit Diagnoses       Chronic prescription benzodiazepine use    -  Primary    Relevant Orders    Drug screen panel, in-house          Prescription drug monitoring program has been reviewed and is consistent with patient's prescription history. There is no evidence of early fills or obtaining controlled rx's from multiple providers.   Has a non [facility] PCP  Does not desire to establish care at this time.

## 2024-01-24 NOTE — TELEPHONE ENCOUNTER
----- Message from Liya Stewart sent at 1/24/2024 11:57 AM CST -----  Contact: Conrado  Type: Needs Medical Advice    Who Called: Conrado/ [facility]  Best Call Back Number: [phone]  Additional  Information: Requesting a call back need date last office visit w/ diagnosis code attached to prescription for ALPRAZolam (XANAX) 0.25 MG tablet. PT has not had this RX filled there before  Please Advise- Thank you

## 2024-01-24 NOTE — TELEPHONE ENCOUNTER
Spoke with Ninfa, pharmacist at [facility]'s pharmacy. LOV date given.Insurance will only cover 30 day supply, will fill 30 day and place rest on hold. Both parties voiced understanding.

## 2024-05-21 DIAGNOSIS — E22.0 ACROMEGALY: Primary | ICD-10-CM

## 2024-05-21 DIAGNOSIS — E27.49 SECONDARY ADRENAL INSUFFICIENCY: ICD-10-CM

## 2024-05-21 DIAGNOSIS — E03.8 SECONDARY HYPOTHYROIDISM: ICD-10-CM

## 2024-05-21 RX ORDER — HYDROCORTISONE 10 MG/1
TABLET ORAL
Qty: 270 TABLET | Refills: 3 | Status: SHIPPED | OUTPATIENT
Start: 2024-05-21

## 2024-05-21 NOTE — TELEPHONE ENCOUNTER
----- Message from Vanessamaurilio Damir sent at 5/20/2024  9:27 AM CDT -----  Regarding: Refill  Contact: [phone]  Rx Refill/Request    Is this a Refill or New Rx:    Rx Name and Strength:    Hydrocortisone (CORTEF) 10 MG Tab    Preferred Pharmacy with phone number:     [id number] - [place], [place] - [address] [facility] [address] [place] [address]  Phone: [phone] Fax: [phone]           Communication Preference: Call       Additional Information:  Pt states she currently out of medication. Please call pt back [phone]

## 2024-06-13 DIAGNOSIS — E03.8 SECONDARY HYPOTHYROIDISM: ICD-10-CM

## 2024-06-13 RX ORDER — LEVOTHYROXINE SODIUM 75 UG/1
75 TABLET ORAL
Qty: 90 TABLET | Refills: 3 | Status: SHIPPED | OUTPATIENT
Start: 2024-06-13

## 2024-06-23 DIAGNOSIS — D35.2 PROLACTINOMA: ICD-10-CM

## 2024-06-23 DIAGNOSIS — E22.0 ACROMEGALY: ICD-10-CM

## 2024-06-24 RX ORDER — CABERGOLINE 0.5 MG/1
TABLET ORAL
Qty: 18 TABLET | Refills: 3 | Status: SHIPPED | OUTPATIENT
Start: 2024-06-24

## 2024-10-21 ENCOUNTER — PATIENT MESSAGE (OUTPATIENT)
Dept: INTERVENTIONAL RADIOLOGY/VASCULAR | Facility: CLINIC | Age: 69
End: 2024-10-21
Payer: MEDICARE

## 2024-10-21 ENCOUNTER — PATIENT MESSAGE (OUTPATIENT)
Dept: NEUROSURGERY | Facility: CLINIC | Age: 69
End: 2024-10-21
Payer: MEDICARE

## 2024-10-21 DIAGNOSIS — D35.2 PROLACTINOMA: Primary | ICD-10-CM

## 2024-10-21 DIAGNOSIS — E22.0 ACROMEGALY: ICD-10-CM

## 2024-10-24 ENCOUNTER — LAB VISIT (OUTPATIENT)
Dept: LAB | Facility: HOSPITAL | Age: 69
End: 2024-10-24
Attending: NEUROLOGICAL SURGERY
Payer: MEDICARE

## 2024-10-24 DIAGNOSIS — D35.2 PROLACTINOMA: ICD-10-CM

## 2024-10-24 DIAGNOSIS — E22.0 ACROMEGALY: ICD-10-CM

## 2024-10-24 LAB
ALBUMIN SERPL BCP-MCNC: 3.7 G/DL (ref 3.5–5.2)
ALP SERPL-CCNC: 57 U/L (ref 40–150)
ALT SERPL W/O P-5'-P-CCNC: 16 U/L (ref 10–44)
ANION GAP SERPL CALC-SCNC: 10 MMOL/L (ref 8–16)
AST SERPL-CCNC: 20 U/L (ref 10–40)
BILIRUB SERPL-MCNC: 0.2 MG/DL (ref 0.1–1)
BUN SERPL-MCNC: 16 MG/DL (ref 8–23)
CALCIUM SERPL-MCNC: 9.2 MG/DL (ref 8.7–10.5)
CHLORIDE SERPL-SCNC: 109 MMOL/L (ref 95–110)
CO2 SERPL-SCNC: 25 MMOL/L (ref 23–29)
CORTIS SERPL-MCNC: 1 UG/DL (ref 4.3–22.4)
CREAT SERPL-MCNC: 0.9 MG/DL (ref 0.5–1.4)
CREAT SERPL-MCNC: 0.9 MG/DL (ref 0.5–1.4)
EST. GFR  (NO RACE VARIABLE): >60 ML/MIN/1.73 M^2
EST. GFR  (NO RACE VARIABLE): >60 ML/MIN/1.73 M^2
FSH SERPL-ACNC: 0.59 MIU/ML
GLUCOSE SERPL-MCNC: 91 MG/DL (ref 70–110)
LH SERPL-ACNC: 0.2 MIU/ML
POTASSIUM SERPL-SCNC: 3.8 MMOL/L (ref 3.5–5.1)
PROLACTIN SERPL IA-MCNC: 7 NG/ML (ref 5.2–26.5)
PROT SERPL-MCNC: 6.2 G/DL (ref 6–8.4)
SODIUM SERPL-SCNC: 144 MMOL/L (ref 136–145)
T4 FREE SERPL-MCNC: 0.91 NG/DL (ref 0.71–1.51)
TSH SERPL DL<=0.005 MIU/L-ACNC: 0.01 UIU/ML (ref 0.4–4)

## 2024-10-24 PROCEDURE — 80053 COMPREHEN METABOLIC PANEL: CPT | Performed by: NEUROLOGICAL SURGERY

## 2024-10-24 PROCEDURE — 83001 ASSAY OF GONADOTROPIN (FSH): CPT | Performed by: NEUROLOGICAL SURGERY

## 2024-10-24 PROCEDURE — 83002 ASSAY OF GONADOTROPIN (LH): CPT | Performed by: NEUROLOGICAL SURGERY

## 2024-10-24 PROCEDURE — 84443 ASSAY THYROID STIM HORMONE: CPT | Performed by: NEUROLOGICAL SURGERY

## 2024-10-24 PROCEDURE — 84439 ASSAY OF FREE THYROXINE: CPT | Performed by: NEUROLOGICAL SURGERY

## 2024-10-24 PROCEDURE — 82533 TOTAL CORTISOL: CPT | Performed by: NEUROLOGICAL SURGERY

## 2024-10-24 PROCEDURE — 82024 ASSAY OF ACTH: CPT | Performed by: NEUROLOGICAL SURGERY

## 2024-10-24 PROCEDURE — 84305 ASSAY OF SOMATOMEDIN: CPT | Performed by: NEUROLOGICAL SURGERY

## 2024-10-24 PROCEDURE — 84146 ASSAY OF PROLACTIN: CPT | Performed by: NEUROLOGICAL SURGERY

## 2024-10-25 LAB — ACTH PLAS-MCNC: <5 PG/ML (ref 0–46)

## 2024-10-28 ENCOUNTER — OFFICE VISIT (OUTPATIENT)
Dept: ENDOCRINOLOGY | Facility: CLINIC | Age: 69
End: 2024-10-28
Payer: MEDICARE

## 2024-10-28 DIAGNOSIS — E03.8 SECONDARY HYPOTHYROIDISM: Primary | ICD-10-CM

## 2024-10-28 DIAGNOSIS — E22.0 ACROMEGALY: ICD-10-CM

## 2024-10-28 DIAGNOSIS — E27.49 SECONDARY ADRENAL INSUFFICIENCY: ICD-10-CM

## 2024-10-28 DIAGNOSIS — D49.7 PITUITARY TUMOR: ICD-10-CM

## 2024-10-28 DIAGNOSIS — D35.2 PROLACTINOMA: ICD-10-CM

## 2024-10-28 PROCEDURE — G2211 COMPLEX E/M VISIT ADD ON: HCPCS | Mod: 95,,, | Performed by: INTERNAL MEDICINE

## 2024-10-28 PROCEDURE — 99214 OFFICE O/P EST MOD 30 MIN: CPT | Mod: 95,,, | Performed by: INTERNAL MEDICINE

## 2024-10-28 RX ORDER — DEXAMETHASONE SODIUM PHOSPHATE 4 MG/ML
4 INJECTION, SOLUTION INTRA-ARTICULAR; INTRALESIONAL; INTRAMUSCULAR; INTRAVENOUS; SOFT TISSUE DAILY PRN
Qty: 1 ML | Refills: 1 | Status: SHIPPED | OUTPATIENT
Start: 2024-10-28

## 2024-10-28 RX ORDER — LEVOTHYROXINE SODIUM 88 UG/1
88 TABLET ORAL
Qty: 90 TABLET | Refills: 3 | Status: SHIPPED | OUTPATIENT
Start: 2024-10-28

## 2024-10-30 LAB
IGF-I SERPL-MCNC: 119 NG/ML (ref 34–194)
IGF-I Z-SCORE SERPL: 0.67 SD

## 2024-11-05 ENCOUNTER — PATIENT MESSAGE (OUTPATIENT)
Dept: NEUROSURGERY | Facility: CLINIC | Age: 69
End: 2024-11-05
Payer: MEDICARE

## 2024-11-08 ENCOUNTER — HOSPITAL ENCOUNTER (OUTPATIENT)
Dept: RADIOLOGY | Facility: HOSPITAL | Age: 69
Discharge: HOME | End: 2024-11-08
Attending: NEUROLOGICAL SURGERY
Payer: MEDICARE

## 2024-11-08 DIAGNOSIS — D35.2 PROLACTINOMA: ICD-10-CM

## 2024-11-08 DIAGNOSIS — E22.0 ACROMEGALY: ICD-10-CM

## 2024-11-08 PROCEDURE — 70553 MRI BRAIN STEM W/O & W/DYE: CPT | Mod: 26,,, | Performed by: RADIOLOGY

## 2024-11-08 PROCEDURE — A9585 GADOBUTROL INJECTION: HCPCS | Performed by: NEUROLOGICAL SURGERY

## 2024-11-08 PROCEDURE — 25500020 PHARM REV CODE 255: Performed by: NEUROLOGICAL SURGERY

## 2024-11-08 PROCEDURE — 70553 MRI BRAIN STEM W/O & W/DYE: CPT | Mod: TC

## 2024-11-08 RX ORDER — GADOBUTROL 604.72 MG/ML
7 INJECTION INTRAVENOUS
Status: COMPLETED | OUTPATIENT
Start: 2024-11-08 | End: 2024-11-08

## 2024-11-08 RX ADMIN — GADOBUTROL 7 ML: 604.72 INJECTION INTRAVENOUS at 10:11

## 2025-01-14 ENCOUNTER — OFFICE VISIT (OUTPATIENT)
Dept: URGENT CARE | Facility: CLINIC | Age: 70
End: 2025-01-14
Payer: MEDICARE

## 2025-01-14 VITALS
OXYGEN SATURATION: 98 % | SYSTOLIC BLOOD PRESSURE: 149 MMHG | DIASTOLIC BLOOD PRESSURE: 80 MMHG | HEART RATE: 81 BPM | TEMPERATURE: 98 F | HEIGHT: 66 IN | RESPIRATION RATE: 19 BRPM | BODY MASS INDEX: 25.71 KG/M2 | WEIGHT: 160 LBS

## 2025-01-14 DIAGNOSIS — R11.0 NAUSEA: ICD-10-CM

## 2025-01-14 DIAGNOSIS — R42 DIZZINESS: Primary | ICD-10-CM

## 2025-01-14 DIAGNOSIS — R26.89 BALANCE PROBLEM: ICD-10-CM

## 2025-01-14 PROCEDURE — 99215 OFFICE O/P EST HI 40 MIN: CPT | Mod: S$GLB,,, | Performed by: NURSE PRACTITIONER

## 2025-01-14 RX ORDER — AZELASTINE 1 MG/ML
1 SPRAY, METERED NASAL 2 TIMES DAILY
COMMUNITY
Start: 2024-08-19

## 2025-01-14 RX ORDER — LEVOCETIRIZINE DIHYDROCHLORIDE 5 MG/1
5 TABLET, FILM COATED ORAL
COMMUNITY
Start: 2024-11-12

## 2025-01-14 NOTE — PROGRESS NOTES
"Subjective:      Patient ID: Mary Kay Schwartz is a 69 y.o. female.    Vitals:  height is 5' 6" (1.676 m) and weight is 72.6 kg (160 lb). Her oral temperature is 98.1 °F (36.7 °C). Her blood pressure is 149/80 (abnormal) and her pulse is 81. Her respiration is 19 and oxygen saturation is 98%.     Chief Complaint: Nausea    69 y.o. female who presents today with a chief complaint of sudden onset dizziness and nausea this afternoon followed by difficulty ambulating due to a problem with balance and feeling as if she was going to fall without holding onto something. She reports that she felt a migraine coming on this morning and took a Fioricet for same. She denies any headache at present. She is observed to be ambulating normal currently. However, she states "Something is wrong with me. I just feel off." She denies any other symptoms or complaints.    Nausea  This is a new problem. The current episode started today. The problem occurs intermittently. The problem has been waxing and waning. Associated symptoms include nausea. Nothing aggravates the symptoms. Treatments tried: Fiorcet and Sudagest. The treatment provided mild relief.       Gastrointestinal:  Positive for nausea.   Skin:  Negative for erythema.   Neurological:  Positive for dizziness and loss of balance.      Objective:     Physical Exam   Constitutional: She is oriented to person, place, and time.  Non-toxic appearance. She does not appear ill. No distress. obesity  HENT:   Head: Normocephalic and atraumatic.   Ears:   Right Ear: Tympanic membrane, external ear and ear canal normal.   Left Ear: Tympanic membrane, external ear and ear canal normal.   Nose: Nose normal.   Mouth/Throat: Mucous membranes are moist. No posterior oropharyngeal erythema. Oropharynx is clear.   Eyes: Conjunctivae are normal. Pupils are equal, round, and reactive to light. Extraocular movement intact   Neck: Neck supple. No neck rigidity present.   Cardiovascular: Normal rate, " regular rhythm, normal heart sounds and normal pulses.   Pulmonary/Chest: Effort normal and breath sounds normal.   Abdominal: Normal appearance and bowel sounds are normal. She exhibits no distension and no mass. Soft. flat abdomen There is no abdominal tenderness.   Musculoskeletal: Normal range of motion.         General: Normal range of motion.      Cervical back: She exhibits no tenderness.   Lymphadenopathy:     She has no cervical adenopathy.   Neurological: no focal deficit. She is alert and oriented to person, place, and time. She displays no weakness. No cranial nerve deficit or sensory deficit. Coordination and gait normal.      Comments: + Romberg.   Skin: Skin is warm, dry, not diaphoretic, not pale and no rash. Capillary refill takes less than 2 seconds. No bruising and No erythema   Psychiatric: Her behavior is normal. Mood normal.   Vitals reviewed.    NOTE: I advised the patient to go to the ER for further evaluation and treatment. She agreed to do same. Her  is here with her and will drive her there.  Assessment:     1. Dizziness    2. Balance problem    3. Nausea        Plan:       Dizziness    Balance problem    Nausea      INSTRUCTIONS  To ER for further evaluation and treatment.

## 2025-01-28 ENCOUNTER — LAB VISIT (OUTPATIENT)
Dept: LAB | Facility: HOSPITAL | Age: 70
End: 2025-01-28
Attending: INTERNAL MEDICINE
Payer: MEDICARE

## 2025-01-28 DIAGNOSIS — E03.8 SECONDARY HYPOTHYROIDISM: ICD-10-CM

## 2025-01-28 LAB — T4 FREE SERPL-MCNC: 1.12 NG/DL (ref 0.71–1.51)

## 2025-01-28 PROCEDURE — 84439 ASSAY OF FREE THYROXINE: CPT | Performed by: INTERNAL MEDICINE

## 2025-01-28 PROCEDURE — 36415 COLL VENOUS BLD VENIPUNCTURE: CPT | Performed by: INTERNAL MEDICINE

## 2025-01-29 ENCOUNTER — PATIENT MESSAGE (OUTPATIENT)
Dept: ENDOCRINOLOGY | Facility: CLINIC | Age: 70
End: 2025-01-29
Payer: MEDICARE

## 2025-05-15 DIAGNOSIS — E22.0 ACROMEGALY: ICD-10-CM

## 2025-05-15 DIAGNOSIS — D35.2 PROLACTINOMA: ICD-10-CM

## 2025-05-16 RX ORDER — CABERGOLINE 0.5 MG/1
TABLET ORAL
Qty: 18 TABLET | Refills: 3 | Status: SHIPPED | OUTPATIENT
Start: 2025-05-16

## (undated) DEVICE — BITE BLOCK ADULT LATEX FREE

## (undated) DEVICE — KIT ENDO CARRY-ON PROC 100310

## (undated) DEVICE — FORCEP BIOSY RJ 4 HOT 2.2X240

## (undated) DEVICE — KIT DEFENDO VLV  AIR WATER SUC

## (undated) DEVICE — SOL WATER STRL IRR 1000ML

## (undated) DEVICE — Device

## (undated) DEVICE — CANISTER SUCTION 3000CC